# Patient Record
Sex: FEMALE | Race: WHITE | NOT HISPANIC OR LATINO | URBAN - METROPOLITAN AREA
[De-identification: names, ages, dates, MRNs, and addresses within clinical notes are randomized per-mention and may not be internally consistent; named-entity substitution may affect disease eponyms.]

---

## 2017-01-23 ENCOUNTER — HOSPITAL ENCOUNTER (OUTPATIENT)
Dept: PHYSICAL MEDICINE AND REHAB | Facility: CLINIC | Age: 82
Discharge: HOME | End: 2017-01-23
Attending: SOCIAL WORKER

## 2017-01-23 DIAGNOSIS — F41.1 GENERALIZED ANXIETY DISORDER: ICD-10-CM

## 2017-01-23 DIAGNOSIS — F33.1 MAJOR DEPRESSIVE DISORDER, RECURRENT EPISODE, MODERATE DEGREE (H): ICD-10-CM

## 2017-02-06 ENCOUNTER — AMBULATORY - HEALTHEAST (OUTPATIENT)
Dept: PHYSICAL MEDICINE AND REHAB | Facility: CLINIC | Age: 82
End: 2017-02-06

## 2017-02-06 ENCOUNTER — HOSPITAL ENCOUNTER (OUTPATIENT)
Dept: PHYSICAL MEDICINE AND REHAB | Facility: CLINIC | Age: 82
Discharge: HOME | End: 2017-02-06
Attending: SOCIAL WORKER

## 2017-02-06 DIAGNOSIS — F33.1 MAJOR DEPRESSIVE DISORDER, RECURRENT EPISODE, MODERATE DEGREE (H): ICD-10-CM

## 2017-02-06 DIAGNOSIS — F41.1 GENERALIZED ANXIETY DISORDER: ICD-10-CM

## 2017-02-21 ENCOUNTER — HOSPITAL ENCOUNTER (OUTPATIENT)
Dept: PHYSICAL MEDICINE AND REHAB | Facility: CLINIC | Age: 82
Discharge: HOME | End: 2017-02-21
Attending: SOCIAL WORKER

## 2017-02-21 DIAGNOSIS — F41.1 GENERALIZED ANXIETY DISORDER: ICD-10-CM

## 2017-02-21 DIAGNOSIS — F33.1 MAJOR DEPRESSIVE DISORDER, RECURRENT EPISODE, MODERATE DEGREE (H): ICD-10-CM

## 2017-03-06 ENCOUNTER — HOSPITAL ENCOUNTER (OUTPATIENT)
Dept: PHYSICAL MEDICINE AND REHAB | Facility: CLINIC | Age: 82
Discharge: HOME | End: 2017-03-06
Attending: SOCIAL WORKER

## 2017-03-06 DIAGNOSIS — F41.1 GENERALIZED ANXIETY DISORDER: ICD-10-CM

## 2017-03-06 DIAGNOSIS — F33.1 MAJOR DEPRESSIVE DISORDER, RECURRENT EPISODE, MODERATE DEGREE (H): ICD-10-CM

## 2017-03-30 ENCOUNTER — HOSPITAL ENCOUNTER (OUTPATIENT)
Dept: PHYSICAL MEDICINE AND REHAB | Facility: CLINIC | Age: 82
Discharge: HOME | End: 2017-03-30
Attending: SOCIAL WORKER

## 2017-03-30 DIAGNOSIS — F33.1 MAJOR DEPRESSIVE DISORDER, RECURRENT EPISODE, MODERATE DEGREE (H): ICD-10-CM

## 2017-03-30 DIAGNOSIS — F41.1 GENERALIZED ANXIETY DISORDER: ICD-10-CM

## 2017-05-09 ENCOUNTER — HOSPITAL ENCOUNTER (OUTPATIENT)
Dept: PHYSICAL MEDICINE AND REHAB | Facility: CLINIC | Age: 82
Discharge: HOME | End: 2017-05-09
Attending: SOCIAL WORKER

## 2017-05-09 DIAGNOSIS — F33.1 MAJOR DEPRESSIVE DISORDER, RECURRENT EPISODE, MODERATE DEGREE (H): ICD-10-CM

## 2017-05-09 DIAGNOSIS — F41.1 GENERALIZED ANXIETY DISORDER: ICD-10-CM

## 2017-05-11 ENCOUNTER — COMMUNICATION - HEALTHEAST (OUTPATIENT)
Dept: PHYSICAL MEDICINE AND REHAB | Facility: CLINIC | Age: 82
End: 2017-05-11

## 2017-05-22 ENCOUNTER — HOSPITAL ENCOUNTER (OUTPATIENT)
Dept: PHYSICAL MEDICINE AND REHAB | Facility: CLINIC | Age: 82
Discharge: HOME | End: 2017-05-22
Attending: SOCIAL WORKER

## 2017-05-22 DIAGNOSIS — F41.1 GENERALIZED ANXIETY DISORDER: ICD-10-CM

## 2017-05-22 DIAGNOSIS — F33.1 MAJOR DEPRESSIVE DISORDER, RECURRENT EPISODE, MODERATE DEGREE (H): ICD-10-CM

## 2017-06-06 ENCOUNTER — HOSPITAL ENCOUNTER (OUTPATIENT)
Dept: PHYSICAL MEDICINE AND REHAB | Facility: CLINIC | Age: 82
Discharge: HOME | End: 2017-06-06
Attending: SOCIAL WORKER

## 2017-06-06 DIAGNOSIS — F33.1 MAJOR DEPRESSIVE DISORDER, RECURRENT EPISODE, MODERATE DEGREE (H): ICD-10-CM

## 2017-06-06 DIAGNOSIS — F41.1 GENERALIZED ANXIETY DISORDER: ICD-10-CM

## 2017-06-27 ENCOUNTER — HOSPITAL ENCOUNTER (OUTPATIENT)
Dept: PHYSICAL MEDICINE AND REHAB | Facility: CLINIC | Age: 82
Discharge: HOME | End: 2017-06-27
Attending: SOCIAL WORKER

## 2017-06-27 DIAGNOSIS — F33.1 MAJOR DEPRESSIVE DISORDER, RECURRENT EPISODE, MODERATE DEGREE (H): ICD-10-CM

## 2017-06-27 DIAGNOSIS — F41.1 GENERALIZED ANXIETY DISORDER: ICD-10-CM

## 2017-07-10 ENCOUNTER — HOSPITAL ENCOUNTER (OUTPATIENT)
Dept: PHYSICAL MEDICINE AND REHAB | Facility: CLINIC | Age: 82
Discharge: HOME | End: 2017-07-10
Attending: SOCIAL WORKER

## 2017-07-10 DIAGNOSIS — F41.1 GENERALIZED ANXIETY DISORDER: ICD-10-CM

## 2017-07-10 DIAGNOSIS — F33.1 MAJOR DEPRESSIVE DISORDER, RECURRENT EPISODE, MODERATE DEGREE (H): ICD-10-CM

## 2017-07-24 ENCOUNTER — HOSPITAL ENCOUNTER (OUTPATIENT)
Dept: PHYSICAL MEDICINE AND REHAB | Facility: CLINIC | Age: 82
Discharge: HOME | End: 2017-07-24
Attending: SOCIAL WORKER

## 2017-07-24 DIAGNOSIS — F33.1 MAJOR DEPRESSIVE DISORDER, RECURRENT EPISODE, MODERATE DEGREE (H): ICD-10-CM

## 2017-07-24 DIAGNOSIS — F41.1 GENERALIZED ANXIETY DISORDER: ICD-10-CM

## 2017-08-07 ENCOUNTER — HOSPITAL ENCOUNTER (OUTPATIENT)
Dept: PHYSICAL MEDICINE AND REHAB | Facility: CLINIC | Age: 82
Discharge: HOME | End: 2017-08-07
Attending: SOCIAL WORKER

## 2017-08-07 ENCOUNTER — AMBULATORY - HEALTHEAST (OUTPATIENT)
Dept: PHYSICAL MEDICINE AND REHAB | Facility: CLINIC | Age: 82
End: 2017-08-07

## 2017-08-07 DIAGNOSIS — F33.1 MAJOR DEPRESSIVE DISORDER, RECURRENT EPISODE, MODERATE DEGREE (H): ICD-10-CM

## 2017-08-07 DIAGNOSIS — F41.1 GENERALIZED ANXIETY DISORDER: ICD-10-CM

## 2017-09-05 ENCOUNTER — HOSPITAL ENCOUNTER (OUTPATIENT)
Dept: PHYSICAL MEDICINE AND REHAB | Facility: CLINIC | Age: 82
Discharge: HOME | End: 2017-09-05
Attending: SOCIAL WORKER

## 2017-09-05 DIAGNOSIS — F33.1 MAJOR DEPRESSIVE DISORDER, RECURRENT EPISODE, MODERATE DEGREE (H): ICD-10-CM

## 2017-09-05 DIAGNOSIS — F41.1 GENERALIZED ANXIETY DISORDER: ICD-10-CM

## 2017-09-19 ENCOUNTER — HOSPITAL ENCOUNTER (OUTPATIENT)
Dept: PHYSICAL MEDICINE AND REHAB | Facility: CLINIC | Age: 82
Discharge: HOME | End: 2017-09-19
Attending: SOCIAL WORKER

## 2017-09-19 DIAGNOSIS — F41.1 GENERALIZED ANXIETY DISORDER: ICD-10-CM

## 2017-09-19 DIAGNOSIS — F33.1 MAJOR DEPRESSIVE DISORDER, RECURRENT EPISODE, MODERATE DEGREE (H): ICD-10-CM

## 2017-10-02 ENCOUNTER — HOSPITAL ENCOUNTER (OUTPATIENT)
Dept: PHYSICAL MEDICINE AND REHAB | Facility: CLINIC | Age: 82
Discharge: HOME | End: 2017-10-02
Attending: SOCIAL WORKER

## 2017-10-02 DIAGNOSIS — F41.1 GENERALIZED ANXIETY DISORDER: ICD-10-CM

## 2017-10-02 DIAGNOSIS — F33.1 MAJOR DEPRESSIVE DISORDER, RECURRENT EPISODE, MODERATE DEGREE (H): ICD-10-CM

## 2017-10-16 ENCOUNTER — HOSPITAL ENCOUNTER (OUTPATIENT)
Dept: PHYSICAL MEDICINE AND REHAB | Facility: CLINIC | Age: 82
Discharge: HOME | End: 2017-10-16
Attending: SOCIAL WORKER

## 2017-10-16 DIAGNOSIS — F33.1 MAJOR DEPRESSIVE DISORDER, RECURRENT EPISODE, MODERATE DEGREE (H): ICD-10-CM

## 2017-10-16 DIAGNOSIS — F41.1 GENERALIZED ANXIETY DISORDER: ICD-10-CM

## 2017-10-31 ENCOUNTER — HOSPITAL ENCOUNTER (OUTPATIENT)
Dept: PHYSICAL MEDICINE AND REHAB | Facility: CLINIC | Age: 82
Discharge: HOME | End: 2017-10-31
Attending: SOCIAL WORKER

## 2017-10-31 DIAGNOSIS — F33.1 MAJOR DEPRESSIVE DISORDER, RECURRENT EPISODE, MODERATE DEGREE (H): ICD-10-CM

## 2017-10-31 DIAGNOSIS — F41.1 GENERALIZED ANXIETY DISORDER: ICD-10-CM

## 2017-11-10 ENCOUNTER — HOSPITAL ENCOUNTER (OUTPATIENT)
Dept: PHYSICAL MEDICINE AND REHAB | Facility: CLINIC | Age: 82
Discharge: HOME | End: 2017-11-10
Attending: SOCIAL WORKER

## 2017-11-10 DIAGNOSIS — F33.1 MAJOR DEPRESSIVE DISORDER, RECURRENT EPISODE, MODERATE DEGREE (H): ICD-10-CM

## 2017-11-10 DIAGNOSIS — F41.1 GENERALIZED ANXIETY DISORDER: ICD-10-CM

## 2017-11-27 ENCOUNTER — HOSPITAL ENCOUNTER (OUTPATIENT)
Dept: PHYSICAL MEDICINE AND REHAB | Facility: CLINIC | Age: 82
Discharge: HOME | End: 2017-11-27
Attending: SOCIAL WORKER

## 2017-11-27 DIAGNOSIS — F33.1 MAJOR DEPRESSIVE DISORDER, RECURRENT EPISODE, MODERATE DEGREE (H): ICD-10-CM

## 2017-11-27 DIAGNOSIS — F41.1 GENERALIZED ANXIETY DISORDER: ICD-10-CM

## 2018-01-03 ENCOUNTER — HOSPITAL ENCOUNTER (OUTPATIENT)
Dept: PHYSICAL MEDICINE AND REHAB | Facility: CLINIC | Age: 83
Discharge: HOME | End: 2018-01-03
Attending: SOCIAL WORKER

## 2018-01-03 DIAGNOSIS — F33.1 MAJOR DEPRESSIVE DISORDER, RECURRENT EPISODE, MODERATE DEGREE (H): ICD-10-CM

## 2018-01-03 DIAGNOSIS — F41.1 GENERALIZED ANXIETY DISORDER: ICD-10-CM

## 2018-01-31 ENCOUNTER — HOSPITAL ENCOUNTER (OUTPATIENT)
Dept: PHYSICAL MEDICINE AND REHAB | Facility: CLINIC | Age: 83
Discharge: HOME | End: 2018-01-31
Attending: SOCIAL WORKER

## 2018-01-31 DIAGNOSIS — F33.1 MAJOR DEPRESSIVE DISORDER, RECURRENT EPISODE, MODERATE DEGREE (H): ICD-10-CM

## 2018-01-31 DIAGNOSIS — F41.1 GENERALIZED ANXIETY DISORDER: ICD-10-CM

## 2018-03-12 ENCOUNTER — HOSPITAL ENCOUNTER (OUTPATIENT)
Dept: PHYSICAL MEDICINE AND REHAB | Facility: CLINIC | Age: 83
Discharge: HOME | End: 2018-03-12
Attending: SOCIAL WORKER

## 2018-03-12 DIAGNOSIS — F41.1 GENERALIZED ANXIETY DISORDER: ICD-10-CM

## 2018-03-12 DIAGNOSIS — F33.1 MAJOR DEPRESSIVE DISORDER, RECURRENT EPISODE, MODERATE DEGREE (H): ICD-10-CM

## 2018-04-17 ENCOUNTER — AMBULATORY - HEALTHEAST (OUTPATIENT)
Dept: PHYSICAL MEDICINE AND REHAB | Facility: CLINIC | Age: 83
End: 2018-04-17

## 2018-04-17 ENCOUNTER — HOSPITAL ENCOUNTER (OUTPATIENT)
Dept: PHYSICAL MEDICINE AND REHAB | Facility: CLINIC | Age: 83
Discharge: HOME | End: 2018-04-17
Attending: SOCIAL WORKER

## 2018-04-17 DIAGNOSIS — F33.1 MAJOR DEPRESSIVE DISORDER, RECURRENT EPISODE, MODERATE DEGREE (H): ICD-10-CM

## 2018-04-17 DIAGNOSIS — F41.1 GENERALIZED ANXIETY DISORDER: ICD-10-CM

## 2018-05-04 ENCOUNTER — AMBULATORY - HEALTHEAST (OUTPATIENT)
Dept: VASCULAR SURGERY | Facility: CLINIC | Age: 83
End: 2018-05-04

## 2018-05-04 DIAGNOSIS — S55.101A: ICD-10-CM

## 2018-05-04 DIAGNOSIS — M25.539 WRIST PAIN: ICD-10-CM

## 2018-05-04 DIAGNOSIS — M79.601 ARM PAIN, ANTERIOR, RIGHT: ICD-10-CM

## 2018-05-07 ENCOUNTER — RECORDS - HEALTHEAST (OUTPATIENT)
Dept: VASCULAR ULTRASOUND | Facility: CLINIC | Age: 83
End: 2018-05-07

## 2018-05-07 ENCOUNTER — COMMUNICATION - HEALTHEAST (OUTPATIENT)
Dept: VASCULAR SURGERY | Facility: CLINIC | Age: 83
End: 2018-05-07

## 2018-05-07 ENCOUNTER — OFFICE VISIT - HEALTHEAST (OUTPATIENT)
Dept: VASCULAR SURGERY | Facility: CLINIC | Age: 83
End: 2018-05-07

## 2018-05-07 DIAGNOSIS — M48.02 SPINAL STENOSIS OF CERVICAL REGION: ICD-10-CM

## 2018-05-07 DIAGNOSIS — S55.101A: ICD-10-CM

## 2018-05-07 DIAGNOSIS — M79.601 PAIN IN RIGHT ARM: ICD-10-CM

## 2018-05-07 DIAGNOSIS — M25.539 PAIN IN UNSPECIFIED WRIST: ICD-10-CM

## 2018-05-16 ENCOUNTER — AMBULATORY - HEALTHEAST (OUTPATIENT)
Dept: LAB | Facility: HOSPITAL | Age: 83
End: 2018-05-16

## 2018-05-16 ENCOUNTER — HOSPITAL ENCOUNTER (OUTPATIENT)
Dept: PHYSICAL MEDICINE AND REHAB | Facility: CLINIC | Age: 83
Discharge: HOME | End: 2018-05-16
Attending: SURGERY

## 2018-05-16 DIAGNOSIS — B27.90 EPSTEIN BARR VIRUS INFECTION: ICD-10-CM

## 2018-05-16 DIAGNOSIS — M79.18 MYOFASCIAL PAIN: ICD-10-CM

## 2018-05-16 DIAGNOSIS — M54.2 NECK PAIN: ICD-10-CM

## 2018-05-16 DIAGNOSIS — G93.32 CHRONIC FATIGUE SYNDROME: ICD-10-CM

## 2018-05-16 DIAGNOSIS — M54.12 CERVICAL RADICULITIS: ICD-10-CM

## 2018-05-16 LAB
ALT SERPL W P-5'-P-CCNC: 12 U/L (ref 0–45)
AST SERPL W P-5'-P-CCNC: 19 U/L (ref 0–40)

## 2018-05-23 ENCOUNTER — HOSPITAL ENCOUNTER (OUTPATIENT)
Dept: PHYSICAL MEDICINE AND REHAB | Facility: CLINIC | Age: 83
Discharge: HOME | End: 2018-05-23
Attending: SOCIAL WORKER

## 2018-05-23 DIAGNOSIS — F41.1 GENERALIZED ANXIETY DISORDER: ICD-10-CM

## 2018-05-23 DIAGNOSIS — F33.1 MAJOR DEPRESSIVE DISORDER, RECURRENT EPISODE, MODERATE DEGREE (H): ICD-10-CM

## 2018-06-04 ENCOUNTER — COMMUNICATION - HEALTHEAST (OUTPATIENT)
Dept: PHYSICAL MEDICINE AND REHAB | Facility: CLINIC | Age: 83
End: 2018-06-04

## 2018-06-06 ENCOUNTER — COMMUNICATION - HEALTHEAST (OUTPATIENT)
Dept: PHYSICAL MEDICINE AND REHAB | Facility: CLINIC | Age: 83
End: 2018-06-06

## 2018-06-18 ENCOUNTER — HOSPITAL ENCOUNTER (OUTPATIENT)
Dept: PHYSICAL MEDICINE AND REHAB | Facility: CLINIC | Age: 83
Discharge: HOME | End: 2018-06-18
Attending: SOCIAL WORKER

## 2018-06-18 DIAGNOSIS — F33.1 MAJOR DEPRESSIVE DISORDER, RECURRENT EPISODE, MODERATE DEGREE (H): ICD-10-CM

## 2018-06-18 DIAGNOSIS — F41.1 GENERALIZED ANXIETY DISORDER: ICD-10-CM

## 2018-07-09 ENCOUNTER — COMMUNICATION - HEALTHEAST (OUTPATIENT)
Dept: PHYSICAL MEDICINE AND REHAB | Facility: CLINIC | Age: 83
End: 2018-07-09

## 2018-07-12 ENCOUNTER — HOSPITAL ENCOUNTER (OUTPATIENT)
Dept: PHYSICAL MEDICINE AND REHAB | Facility: CLINIC | Age: 83
Discharge: HOME | End: 2018-07-12
Attending: PHYSICIAN ASSISTANT

## 2018-07-12 DIAGNOSIS — M54.2 CERVICAL SPINE PAIN: ICD-10-CM

## 2018-07-12 DIAGNOSIS — M25.511 RIGHT SHOULDER PAIN: ICD-10-CM

## 2018-07-12 DIAGNOSIS — M54.12 CERVICAL RADICULITIS: ICD-10-CM

## 2018-07-12 RX ORDER — AMITRIPTYLINE HYDROCHLORIDE 10 MG/1
20 TABLET ORAL AT BEDTIME
Status: SHIPPED | COMMUNITY
Start: 2018-05-21

## 2018-07-18 ENCOUNTER — HOSPITAL ENCOUNTER (OUTPATIENT)
Dept: PHYSICAL MEDICINE AND REHAB | Facility: CLINIC | Age: 83
Discharge: HOME | End: 2018-07-18
Attending: SOCIAL WORKER

## 2018-07-18 DIAGNOSIS — F33.1 MAJOR DEPRESSIVE DISORDER, RECURRENT EPISODE, MODERATE DEGREE (H): ICD-10-CM

## 2018-07-18 DIAGNOSIS — F41.1 GENERALIZED ANXIETY DISORDER: ICD-10-CM

## 2018-07-26 ENCOUNTER — COMMUNICATION - HEALTHEAST (OUTPATIENT)
Dept: PHYSICAL MEDICINE AND REHAB | Facility: CLINIC | Age: 83
End: 2018-07-26

## 2018-07-26 ENCOUNTER — RECORDS - HEALTHEAST (OUTPATIENT)
Dept: ADMINISTRATIVE | Facility: OTHER | Age: 83
End: 2018-07-26

## 2018-07-26 ENCOUNTER — RECORDS - HEALTHEAST (OUTPATIENT)
Dept: LAB | Facility: CLINIC | Age: 83
End: 2018-07-26

## 2018-07-26 DIAGNOSIS — M75.100 ROTATOR CUFF TEAR: ICD-10-CM

## 2018-07-26 LAB — VIT B12 SERPL-MCNC: 401 PG/ML (ref 213–816)

## 2018-07-27 LAB — 25(OH)D3 SERPL-MCNC: 51.9 NG/ML (ref 30–80)

## 2018-08-01 ENCOUNTER — COMMUNICATION - HEALTHEAST (OUTPATIENT)
Dept: PHYSICAL MEDICINE AND REHAB | Facility: CLINIC | Age: 83
End: 2018-08-01

## 2018-08-01 DIAGNOSIS — M75.100 SUPRASPINATUS TENDON TEAR: ICD-10-CM

## 2018-08-01 DIAGNOSIS — M25.519 SHOULDER PAIN: ICD-10-CM

## 2018-08-09 ENCOUNTER — OFFICE VISIT - HEALTHEAST (OUTPATIENT)
Dept: FAMILY MEDICINE | Facility: CLINIC | Age: 83
End: 2018-08-09

## 2018-08-09 DIAGNOSIS — R10.11 RUQ ABDOMINAL PAIN: ICD-10-CM

## 2018-08-09 DIAGNOSIS — M54.12 CERVICAL RADICULOPATHY: ICD-10-CM

## 2018-08-09 DIAGNOSIS — M75.100 ROTATOR CUFF TEAR: ICD-10-CM

## 2018-08-09 ASSESSMENT — MIFFLIN-ST. JEOR: SCORE: 1096.33

## 2018-08-23 ENCOUNTER — RECORDS - HEALTHEAST (OUTPATIENT)
Dept: ADMINISTRATIVE | Facility: OTHER | Age: 83
End: 2018-08-23

## 2018-08-27 ENCOUNTER — RECORDS - HEALTHEAST (OUTPATIENT)
Dept: ADMINISTRATIVE | Facility: OTHER | Age: 83
End: 2018-08-27

## 2018-08-29 ENCOUNTER — HOSPITAL ENCOUNTER (OUTPATIENT)
Dept: PHYSICAL MEDICINE AND REHAB | Facility: CLINIC | Age: 83
Discharge: HOME | End: 2018-08-29
Attending: SOCIAL WORKER

## 2018-08-29 DIAGNOSIS — F33.1 MAJOR DEPRESSIVE DISORDER, RECURRENT EPISODE, MODERATE DEGREE (H): ICD-10-CM

## 2018-08-29 DIAGNOSIS — F41.1 GENERALIZED ANXIETY DISORDER: ICD-10-CM

## 2018-09-26 ENCOUNTER — HOSPITAL ENCOUNTER (OUTPATIENT)
Dept: PHYSICAL MEDICINE AND REHAB | Facility: CLINIC | Age: 83
Discharge: HOME | End: 2018-09-26
Attending: SOCIAL WORKER

## 2018-09-26 DIAGNOSIS — F41.1 GENERALIZED ANXIETY DISORDER: ICD-10-CM

## 2018-09-26 DIAGNOSIS — F33.1 MAJOR DEPRESSIVE DISORDER, RECURRENT EPISODE, MODERATE DEGREE (H): ICD-10-CM

## 2018-11-07 ENCOUNTER — HOSPITAL ENCOUNTER (OUTPATIENT)
Dept: PHYSICAL MEDICINE AND REHAB | Facility: CLINIC | Age: 83
Discharge: HOME | End: 2018-11-07
Attending: SOCIAL WORKER

## 2018-11-07 ENCOUNTER — AMBULATORY - HEALTHEAST (OUTPATIENT)
Dept: PHYSICAL MEDICINE AND REHAB | Facility: CLINIC | Age: 83
End: 2018-11-07

## 2018-11-07 DIAGNOSIS — F33.1 MAJOR DEPRESSIVE DISORDER, RECURRENT EPISODE, MODERATE DEGREE (H): ICD-10-CM

## 2018-11-07 DIAGNOSIS — F41.1 GENERALIZED ANXIETY DISORDER: ICD-10-CM

## 2018-12-12 ENCOUNTER — HOSPITAL ENCOUNTER (OUTPATIENT)
Dept: PHYSICAL MEDICINE AND REHAB | Facility: CLINIC | Age: 83
Discharge: HOME | End: 2018-12-12
Attending: SOCIAL WORKER

## 2018-12-12 DIAGNOSIS — F41.1 GENERALIZED ANXIETY DISORDER: ICD-10-CM

## 2018-12-12 DIAGNOSIS — F33.1 MAJOR DEPRESSIVE DISORDER, RECURRENT EPISODE, MODERATE DEGREE (H): ICD-10-CM

## 2019-01-03 ENCOUNTER — RECORDS - HEALTHEAST (OUTPATIENT)
Dept: ADMINISTRATIVE | Facility: OTHER | Age: 84
End: 2019-01-03

## 2019-01-09 ENCOUNTER — HOSPITAL ENCOUNTER (OUTPATIENT)
Dept: PHYSICAL MEDICINE AND REHAB | Facility: CLINIC | Age: 84
Discharge: HOME | End: 2019-01-09
Attending: SOCIAL WORKER

## 2019-01-09 DIAGNOSIS — F41.1 GENERALIZED ANXIETY DISORDER: ICD-10-CM

## 2019-01-09 DIAGNOSIS — F33.1 MAJOR DEPRESSIVE DISORDER, RECURRENT EPISODE, MODERATE DEGREE (H): ICD-10-CM

## 2019-02-22 ENCOUNTER — HOSPITAL ENCOUNTER (OUTPATIENT)
Dept: PHYSICAL MEDICINE AND REHAB | Facility: CLINIC | Age: 84
Discharge: HOME | End: 2019-02-22
Attending: SOCIAL WORKER

## 2019-02-22 DIAGNOSIS — F41.1 GENERALIZED ANXIETY DISORDER: ICD-10-CM

## 2019-02-22 DIAGNOSIS — F33.1 MAJOR DEPRESSIVE DISORDER, RECURRENT EPISODE, MODERATE DEGREE (H): ICD-10-CM

## 2019-03-13 ENCOUNTER — HOSPITAL ENCOUNTER (OUTPATIENT)
Dept: MAMMOGRAPHY | Facility: CLINIC | Age: 84
Discharge: HOME | End: 2019-03-13
Attending: FAMILY MEDICINE

## 2019-03-13 DIAGNOSIS — Z12.31 VISIT FOR SCREENING MAMMOGRAM: ICD-10-CM

## 2019-03-19 ENCOUNTER — HOSPITAL ENCOUNTER (OUTPATIENT)
Dept: PHYSICAL MEDICINE AND REHAB | Facility: CLINIC | Age: 84
Discharge: HOME | End: 2019-03-19
Attending: SOCIAL WORKER

## 2019-03-19 DIAGNOSIS — F33.1 MAJOR DEPRESSIVE DISORDER, RECURRENT EPISODE, MODERATE DEGREE (H): ICD-10-CM

## 2019-03-19 DIAGNOSIS — F41.1 GENERALIZED ANXIETY DISORDER: ICD-10-CM

## 2019-04-16 ENCOUNTER — HOSPITAL ENCOUNTER (OUTPATIENT)
Dept: PHYSICAL MEDICINE AND REHAB | Facility: CLINIC | Age: 84
Discharge: HOME | End: 2019-04-16
Attending: SOCIAL WORKER

## 2019-04-16 DIAGNOSIS — F41.1 GENERALIZED ANXIETY DISORDER: ICD-10-CM

## 2019-04-16 DIAGNOSIS — F33.1 MAJOR DEPRESSIVE DISORDER, RECURRENT EPISODE, MODERATE DEGREE (H): ICD-10-CM

## 2019-05-15 ENCOUNTER — HOSPITAL ENCOUNTER (OUTPATIENT)
Dept: PHYSICAL MEDICINE AND REHAB | Facility: CLINIC | Age: 84
Discharge: HOME | End: 2019-05-15
Attending: SOCIAL WORKER

## 2019-05-15 ENCOUNTER — RECORDS - HEALTHEAST (OUTPATIENT)
Dept: ADMINISTRATIVE | Facility: OTHER | Age: 84
End: 2019-05-15

## 2019-05-15 DIAGNOSIS — F33.1 MAJOR DEPRESSIVE DISORDER, RECURRENT EPISODE, MODERATE DEGREE (H): ICD-10-CM

## 2019-05-15 DIAGNOSIS — F41.1 GENERALIZED ANXIETY DISORDER: ICD-10-CM

## 2019-05-22 ENCOUNTER — HOSPITAL ENCOUNTER (OUTPATIENT)
Dept: CT IMAGING | Facility: HOSPITAL | Age: 84
Discharge: HOME | End: 2019-05-22
Attending: COLON & RECTAL SURGERY

## 2019-05-22 DIAGNOSIS — R10.31 RLQ ABDOMINAL PAIN: ICD-10-CM

## 2019-05-22 LAB
CREAT BLD-MCNC: 1.1 MG/DL (ref 0.6–1.1)
GFR SERPL CREATININE-BSD FRML MDRD: 47 ML/MIN/1.73M2

## 2019-07-31 ENCOUNTER — AMBULATORY - HEALTHEAST (OUTPATIENT)
Dept: PHYSICAL MEDICINE AND REHAB | Facility: CLINIC | Age: 84
End: 2019-07-31

## 2019-08-08 ENCOUNTER — RECORDS - HEALTHEAST (OUTPATIENT)
Dept: ADMINISTRATIVE | Facility: OTHER | Age: 84
End: 2019-08-08

## 2019-08-08 ENCOUNTER — RECORDS - HEALTHEAST (OUTPATIENT)
Dept: LAB | Facility: CLINIC | Age: 84
End: 2019-08-08

## 2019-08-08 LAB
HBA1C MFR BLD: 5.3 % (ref 4.2–6.1)
RHEUMATOID FACT SERPL-ACNC: 46.6 IU/ML (ref 0–30)
URATE SERPL-MCNC: 4.1 MG/DL (ref 2–7.5)

## 2019-08-09 ENCOUNTER — RECORDS - HEALTHEAST (OUTPATIENT)
Dept: LAB | Facility: CLINIC | Age: 84
End: 2019-08-09

## 2019-08-09 LAB — 25(OH)D3 SERPL-MCNC: 41.5 NG/ML (ref 30–80)

## 2019-08-13 ENCOUNTER — RECORDS - HEALTHEAST (OUTPATIENT)
Dept: ADMINISTRATIVE | Facility: OTHER | Age: 84
End: 2019-08-13

## 2019-08-14 ENCOUNTER — COMMUNICATION - HEALTHEAST (OUTPATIENT)
Dept: ADMINISTRATIVE | Facility: CLINIC | Age: 84
End: 2019-08-14

## 2019-08-15 ENCOUNTER — OFFICE VISIT - HEALTHEAST (OUTPATIENT)
Dept: RHEUMATOLOGY | Facility: CLINIC | Age: 84
End: 2019-08-15

## 2019-08-15 DIAGNOSIS — M15.0 PRIMARY GENERALIZED (OSTEO)ARTHRITIS: ICD-10-CM

## 2019-08-15 DIAGNOSIS — M05.9 SEROPOSITIVE RHEUMATOID ARTHRITIS (H): ICD-10-CM

## 2019-08-15 DIAGNOSIS — M15.0 PRIMARY OSTEOARTHRITIS INVOLVING MULTIPLE JOINTS: ICD-10-CM

## 2019-08-15 DIAGNOSIS — M25.50 POLYARTHRALGIA: ICD-10-CM

## 2019-08-15 LAB
ALBUMIN SERPL-MCNC: 3.6 G/DL (ref 3.5–5)
ALT SERPL W P-5'-P-CCNC: 16 U/L (ref 0–45)
BASOPHILS # BLD AUTO: 0.1 THOU/UL (ref 0–0.2)
BASOPHILS NFR BLD AUTO: 1 % (ref 0–2)
C REACTIVE PROTEIN LHE: 1.4 MG/DL (ref 0–0.8)
CCP AB SER IA-ACNC: 2.2 U/ML
CREAT SERPL-MCNC: 0.83 MG/DL (ref 0.6–1.1)
EOSINOPHIL # BLD AUTO: 0.3 THOU/UL (ref 0–0.4)
EOSINOPHIL NFR BLD AUTO: 4 % (ref 0–6)
ERYTHROCYTE [DISTWIDTH] IN BLOOD BY AUTOMATED COUNT: 11.4 % (ref 11–14.5)
ERYTHROCYTE [SEDIMENTATION RATE] IN BLOOD BY WESTERGREN METHOD: 13 MM/HR (ref 0–20)
GFR SERPL CREATININE-BSD FRML MDRD: >60 ML/MIN/1.73M2
HCT VFR BLD AUTO: 38.3 % (ref 35–47)
HGB BLD-MCNC: 12.9 G/DL (ref 12–16)
LYMPHOCYTES # BLD AUTO: 2.3 THOU/UL (ref 0.8–4.4)
LYMPHOCYTES NFR BLD AUTO: 27 % (ref 20–40)
MCH RBC QN AUTO: 32.1 PG (ref 27–34)
MCHC RBC AUTO-ENTMCNC: 33.7 G/DL (ref 32–36)
MCV RBC AUTO: 95 FL (ref 80–100)
MONOCYTES # BLD AUTO: 0.7 THOU/UL (ref 0–0.9)
MONOCYTES NFR BLD AUTO: 8 % (ref 2–10)
NEUTROPHILS # BLD AUTO: 5.1 THOU/UL (ref 2–7.7)
NEUTROPHILS NFR BLD AUTO: 60 % (ref 50–70)
PLATELET # BLD AUTO: 298 THOU/UL (ref 140–440)
PMV BLD AUTO: 7 FL (ref 7–10)
RBC # BLD AUTO: 4.02 MILL/UL (ref 3.8–5.4)
WBC: 8.6 THOU/UL (ref 4–11)

## 2019-08-15 ASSESSMENT — MIFFLIN-ST. JEOR: SCORE: 1110.4

## 2019-08-16 LAB
HBV SURFACE AG SERPL QL IA: NEGATIVE
HCV AB SERPL QL IA: NEGATIVE

## 2019-08-19 LAB
ANA SER QL: 4.6 U
ANCA IGG TITR SER IF: NORMAL {TITER}

## 2019-08-20 LAB
DNA (DS) ANTIBODY - HISTORICAL: 7 IU
JO-1 AUTOANTIBODIES - HISTORICAL: 0 EU
SCL-70 AUTOANTIBODIES - HISTORICAL: 0 EU
SM (SMITH AUTOANTIBODIES - HISTORICAL: 3 EU
SM/RNP AUTOANTIBODIES - HISTORICAL: 1 EU
SS-A/RO AUTOANTIBODIES - HISTORICAL: 77 EU
SS-B/LA AUTOANTIBODIES - HISTORICAL: 2 EU

## 2019-08-23 ENCOUNTER — RECORDS - HEALTHEAST (OUTPATIENT)
Dept: HEALTH INFORMATION MANAGEMENT | Facility: CLINIC | Age: 84
End: 2019-08-23

## 2019-09-03 ENCOUNTER — ANESTHESIA - HEALTHEAST (OUTPATIENT)
Dept: SURGERY | Facility: HOSPITAL | Age: 84
End: 2019-09-03

## 2019-09-03 ENCOUNTER — SURGERY - HEALTHEAST (OUTPATIENT)
Dept: SURGERY | Facility: HOSPITAL | Age: 84
End: 2019-09-03

## 2019-09-05 ASSESSMENT — MIFFLIN-ST. JEOR: SCORE: 1098.6

## 2019-09-10 ENCOUNTER — SURGERY - HEALTHEAST (OUTPATIENT)
Dept: SURGERY | Facility: HOSPITAL | Age: 84
End: 2019-09-10

## 2019-09-10 ENCOUNTER — ANESTHESIA - HEALTHEAST (OUTPATIENT)
Dept: SURGERY | Facility: HOSPITAL | Age: 84
End: 2019-09-10

## 2019-10-17 ENCOUNTER — OFFICE VISIT - HEALTHEAST (OUTPATIENT)
Dept: FAMILY MEDICINE | Facility: CLINIC | Age: 84
End: 2019-10-17

## 2019-10-17 DIAGNOSIS — R60.0 LOWER LEG EDEMA: ICD-10-CM

## 2019-10-17 DIAGNOSIS — B02.9 HERPES ZOSTER WITHOUT COMPLICATION: ICD-10-CM

## 2019-10-17 RX ORDER — OXYCODONE HCL 10 MG/1
10 TABLET, FILM COATED, EXTENDED RELEASE ORAL
Status: SHIPPED | COMMUNITY
Start: 2019-10-17

## 2021-05-25 ENCOUNTER — RECORDS - HEALTHEAST (OUTPATIENT)
Dept: ADMINISTRATIVE | Facility: CLINIC | Age: 86
End: 2021-05-25

## 2021-05-27 ENCOUNTER — RECORDS - HEALTHEAST (OUTPATIENT)
Dept: ADMINISTRATIVE | Facility: CLINIC | Age: 86
End: 2021-05-27

## 2021-05-27 NOTE — PROGRESS NOTES
MENTAL HEALTH VISIT NOTE    Date of Service: 4/16/2019    Start time:2:00 pm  Stop time:2:49 pm  This is session number 4 this calendar year.  The patient was seen for individual psychotherapy    No  was required because the patient speaks and understands English.  Shyanne Gonzalez is a 83 y.o. female is being seen today for individual psychotherapy to address the following:    Chief Complaint   Patient presents with     Depression     Anxiety   .     NECESSITY: This individual session was necessary for the care of the patient to address difficulties in psychosocial functioning that are affected by and affect the patient's ability to cope with and heal from spinal conditions and are affected by the patient's mental health diagnosis listed in the diagnosis section below.    New symptoms or complaints: None    Functional Impairment (0-4):   Personal: 3  Family: 1  Work: 3  Social:3    Clinical Assessment of Mental Status  Mood: Euthymic    Affect:   Congruent with content of speech    Appearance:  well-groomed, casually dressed, engaged    Speech:  Within normal limits    Orientation:   Oriented to person, time, and place    Memory:  No evidence of impairment.    Concentration:  Within normal limits    Focus:   Within normal limits    Fund of knowledge:  adequate    Suicidal Ideation present:   Absent  Suicidal Risk Assessment:  No specific plan to harm self.  Patient does not endorse any intention to harm self.  Patient is aware of resources available if she experiences suicidal ideation.      Homicidal Risk Assessment:   None reported  No crisis plan required due to absence of risk.    Patient's impression of their current status:  Symptoms are staying about the same.  She notes today is the first good day she has had in a number of weeks.    Therapist impression of patient's current status: Symptoms are about the same.    Type of psychotherapeutic technique provided:  CBT, motivational interviewing,  supportive therapy    Response to psychotherapeutic interventions:  Patient responded to interventions in the following manner: Accepting.    Progress toward short term goals:  Progress as expected: Patient is practicing skills taught in psychotherapy and seeing benefits.    Review of long-term goals:  Not done at today's visit. Tx Plan updated 11/7/18:  1. Increase ability to cope well with my chronic health conditions from  some  of the time to most of the time.  2.              Increase % of time I communicate assertively with others from  40% to 80%.  3.  Improve ability to get good sleep from one night out of the week to four nights per week.    Diagnosis:   1. Major depressive disorder, recurrent episode, moderate degree (H)    2. Generalized anxiety disorder      Plan and Follow-Up:  Patient will return for follow-up psychotherapy session in one month.    Patient will complete the following homework before our next session:  Patient plans to continue working with above-mentioned skills.    Discharge Criteria/ Planning:  Patient will continue with follow-up until therapy can be discontinued without return of symptoms.      Candelaria Barr 4/16/2019

## 2021-05-28 ENCOUNTER — RECORDS - HEALTHEAST (OUTPATIENT)
Dept: ADMINISTRATIVE | Facility: CLINIC | Age: 86
End: 2021-05-28

## 2021-05-28 NOTE — PROGRESS NOTES
MENTAL HEALTH VISIT NOTE    Date of Service: 5/15/2019    Start time:3:00 pm  Stop time:3:50 pm  This is session number 5 this calendar year.  The patient was seen for individual psychotherapy    No  was required because the patient speaks and understands English.  Shyanne Gonzalez is a 83 y.o. female is being seen today for individual psychotherapy to address the following:    Chief Complaint   Patient presents with     Depression     Anxiety   .     NECESSITY: This individual session was necessary for the care of the patient to address difficulties in psychosocial functioning that are affected by and affect the patient's ability to cope with and heal from spinal conditions and are affected by the patient's mental health diagnosis listed in the diagnosis section below.    New symptoms or complaints: None    Functional Impairment (0-4):   Personal: 3  Family: 1  Work: 3  Social:2    Clinical Assessment of Mental Status  Mood: Euthymic    Affect:   Congruent with content of speech    Appearance:  well-groomed, casually dressed, engaged    Speech:  Within normal limits    Orientation:   Oriented to person, time, and place    Memory:  No evidence of impairment.    Concentration:  Within normal limits    Focus:   Within normal limits    Fund of knowledge:  adequate    Suicidal Ideation present:   Absent  Suicidal Risk Assessment:  No specific plan to harm self.  Patient does not endorse any intention to harm self.  Patient is aware of resources available if she experiences suicidal ideation.      Homicidal Risk Assessment:   None reported  No crisis plan required due to absence of risk.    Patient's impression of their current status:  Continuing to improve.  Assertiveness has increased.    Therapist impression of patient's current status: Continuing to improve.    Type of psychotherapeutic technique provided:  CBT, motivational interviewing    Response to psychotherapeutic interventions:  Patient responded  to interventions in the following manner: Enthusiastic.    Progress toward short term goals:  Progress as expected: Patient is practicing skills taught in psychotherapy and seeing benefits.    Review of long-term goals:  Not done at today's visit. Tx Plan updated 11/7/18:  1. Increase ability to cope well with my chronic health conditions from  some  of the time to most of the time.  2.              Increase % of time I communicate assertively with others from  40% to 80%.  3.  Improve ability to get good sleep from one night out of the week to four nights per week.    Diagnosis:   1. Major depressive disorder, recurrent episode, moderate degree (H)    2. Generalized anxiety disorder      Plan and Follow-Up:  Patient will return for follow-up psychotherapy session in one month.    Patient will complete the following homework before our next session:  Patient plans to continue working with above-mentioned skills.    Discharge Criteria/ Planning:  Patient will continue with follow-up until therapy can be discontinued without return of symptoms.      Candelaria Barr 5/15/2019

## 2021-05-29 ENCOUNTER — RECORDS - HEALTHEAST (OUTPATIENT)
Dept: ADMINISTRATIVE | Facility: CLINIC | Age: 86
End: 2021-05-29

## 2021-05-31 NOTE — ANESTHESIA PREPROCEDURE EVALUATION
Anesthesia Evaluation      Patient summary reviewed   No history of anesthetic complications     Airway   Mallampati: II  Neck ROM: full   Pulmonary - normal exam    breath sounds clear to auscultation  (+) sleep apnea,   (-) not a smoker    ROS comment: pulm fibrosis                         Cardiovascular - normal exam  (-) angina  Rhythm: regular  Rate: normal,         Neuro/Psych    (+) neuromuscular disease,  anxiety/panic attacks,     Comments: Myofascial pain syndrome  Fibromyalgia  Chronic fatigue    Endo/Other    (+) arthritis,   (-) not pregnant     GI/Hepatic/Renal    (+) GERD,       Comments: incontinence          Dental - normal exam                        Anesthesia Plan  Planned anesthetic: MAC    ASA 2   Induction: intravenous   Anesthetic plan and risks discussed with: patient  Anesthesia plan special considerations: antiemetics,   Post-op plan: other (ALICIA orders)

## 2021-05-31 NOTE — PROGRESS NOTES
As I am leaving [facility] for private practice, I have a duty to inform my patients in a timely manner and to offer options for continuing care per the [employer] Code of Ethics. I have known that I will be leaving on August 5, 2019, since June 14, 2019, and I informed the administration of this clinic of this fact at that time. I have attempted on multiple occassions to get approval from the administration of this clinic to send a letter informing patients of this fact and of their options for care which would include both [facility] and [facility] locations. The approval to send the letter did not come until July 31, 2019, and I have been informed that the letter contains only [facility] options. While this does not meet my recommendation that the client be presented with at least three different clinics, one of which would be [facility], as options for continuing care, this is the choice of the organization. The patient will be sent this letter this week.    The specific date that this letter is sent may be discovered by searching for the letter in the patient's medical record.    As of this writing, I am considering the patient discharged from my care at [facility]. At close of treatment, the patient's condition was gradually improving due to good follow-through up until recent months. Treatment goals were partially met.

## 2021-05-31 NOTE — ANESTHESIA CARE TRANSFER NOTE
Last vitals:   Vitals:    09/03/19 1345   BP: (P) 130/61   Pulse: (P) 73   Resp: (P) 18   Temp: (P) 36.6  C (97.9  F)   SpO2: (!) (P) 88%     Patient's level of consciousness is drowsy  Spontaneous respirations: yes  Maintains airway independently: yes  Dentition unchanged: yes  Oropharynx: oropharynx clear of all foreign objects    QCDR Measures:  ASA# 20 - Surgical Safety Checklist: WHO surgical safety checklist completed prior to induction    PQRS# 430 - Adult PONV Prevention: 4558F - Pt received => 2 anti-emetic agents (different classes) preop & intraop  ASA# 8 - Peds PONV Prevention: NA - Not pediatric patient, not GA or 2 or more risk factors NOT present  PQRS# 424 - Nataliya-op Temp Management: 4559F - At least one body temp DOCUMENTED => 35.5C or 95.9F within required timeframe  PQRS# 426 - PACU Transfer Protocol: - Transfer of care checklist used  ASA# 14 - Acute Post-op Pain: ASA14B - Patient did NOT experience pain >= 7 out of 10

## 2021-05-31 NOTE — ANESTHESIA POSTPROCEDURE EVALUATION
Patient: Shyanne Gonzalez  SACRAL NEUROSTIMULATION STAGE I IMPLANT OF NEUROSTIMULATOR LEAD  Anesthesia type: MAC    Patient location: Phase II Recovery  Last vitals:   Vitals Value Taken Time   /68 9/3/2019  2:00 PM   Temp 36.6  C (97.9  F) 9/3/2019  1:45 PM   Pulse 71 9/3/2019  2:00 PM   Resp 18 9/3/2019  2:00 PM   SpO2 98 % 9/3/2019  2:00 PM     Post vital signs: stable  Level of consciousness: awake and responds to simple questions  Post-anesthesia pain: pain controlled  Post-anesthesia nausea and vomiting: no  Pulmonary: unassisted, return to baseline  Cardiovascular: stable and blood pressure at baseline  Hydration: adequate  Anesthetic events: no    QCDR Measures:  ASA# 11 - Nataliya-op Cardiac Arrest: ASA11B - Patient did NOT experience unanticipated cardiac arrest  ASA# 12 - Nataliya-op Mortality Rate: ASA12B - Patient did NOT die  ASA# 13 - PACU Re-Intubation Rate: NA - No ETT / LMA used for case  ASA# 10 - Composite Anes Safety: ASA10A - No serious adverse event    Additional Notes:

## 2021-05-31 NOTE — PROGRESS NOTES
"ASSESSMENT AND PLAN:  Shyanne Gonzalez 83 y.o. female is seen here on 08/15/19 for evaluation of painful joints, predominantly hands, wrists.  She has rheumatoid arthritis.  This is seropositive for rheumatoid factor.  Further work-up as noted.  Today I had a detailed discussion with her.  She is a firm believer off various natural remedies, kinesiology, kristin chi, yoga.  As we started talking about medications, with her hep B/C status pending, I asked her to start taking hydroxychloroquine.  She performed a quick procedure with her index finger and thumb interlocking, and decided based on her interpretation of this act (of \"kinesiology\") that she cannot take hydroxychloroquine.  She declines to take prednisone.  Major side effects from her ocular metabolic bone related reviewed.  Should she change her mind she will let us know, prednisone 10 mg daily can then be prescribed.  I have asked her to review methotrexate, leflunomide literature.  I have also given her hydroxychloroquine literature.  X-rays of the knees done today, personally reviewed the films, findings: Modest reduction in the joint spaces symmetrically in both the medial and lateral compartments with osteophyte formation.  There is no chondrocalcinosis.  We will check labs as noted.  We will meet here in the next 3 to 4 weeks.     Diagnoses and all orders for this visit:    Seropositive rheumatoid arthritis (H)  -     HM1(CBC and Differential)  -     Creatinine  -     ALT (SGPT)  -     Albumin  -     CCP Antibodies  -     Hepatitis C Antibody (Anti-HCV)  -     Erythrocyte Sedimentation Rate  -     C-Reactive Protein  -     Anti-Neutrophil Cytoplasmic Antibody, IgG (ANCA IFA)  -     Antinuclear Antibody (JACOBY) Cascade  -     Hepatitis B Surface Antigen (HBsAG)  -     XR Hands Bilateral 3 or More VWS; Future; Expected date: 08/15/2019  -     XR Knees Bilateral 1 Or 2 VWS; Future; Expected date: 08/15/2019  -     XR Hands Bilateral 3 or More VWS  -     XR " "Knees Bilateral 1 Or 2 VWS  -     HM1 (CBC with Diff)    Polyarthralgia  -     HM1(CBC and Differential)  -     Creatinine  -     ALT (SGPT)  -     Albumin  -     CCP Antibodies  -     Hepatitis C Antibody (Anti-HCV)  -     Erythrocyte Sedimentation Rate  -     C-Reactive Protein  -     Anti-Neutrophil Cytoplasmic Antibody, IgG (ANCA IFA)  -     Antinuclear Antibody (JACOBY) Cascade  -     Hepatitis B Surface Antigen (HBsAG)  -     XR Hands Bilateral 3 or More VWS; Future; Expected date: 08/15/2019  -     XR Knees Bilateral 1 Or 2 VWS; Future; Expected date: 08/15/2019  -     XR Hands Bilateral 3 or More VWS  -     XR Knees Bilateral 1 Or 2 VWS  -     HM1 (CBC with Diff)    Primary osteoarthritis involving multiple joints  -     XR Hands Bilateral 3 or More VWS; Future; Expected date: 08/15/2019  -     XR Knees Bilateral 1 Or 2 VWS; Future; Expected date: 08/15/2019  -     XR Hands Bilateral 3 or More VWS  -     XR Knees Bilateral 1 Or 2 VWS      HISTORY OF PRESENTING ILLNESS:  Shyanne SEBASTIAN Carlos, 83 y.o., female is here for painful joints.  This is in both her wrists.  This started in June.  This came on fairly quickly.  Her right hand and wrist were swollen and red.  She was seen at [facility] emergency room.  She had various labs drawn.  She was given \"an IV nonsteroidal\".  Since then she continues to hurt.  There are some days which are little better.  Now over the past few days her left wrist has also been hurting.  She has swelling on the volar aspect.  She has noted pain in her thumbs, the left one was red and swollen at one point.  She has had long-standing painful shoulders.  She is known to have glenohumeral osteoarthritis.  She has rotator cuff tendinopathy.  She has been seen and orthopedics.  She noted painful knees.  This is worse with activities.  She had swelling of the lower extremities.  She rates her symptoms as moderately severe to severe.  She reports that for the past 35years she has been " gradually eliminating gluten from her diet.  She is a practitioner of various modalities of treatment, including kristin chi, kinesiology.  She is a firm believer and natural remedies.  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  She lives by herself.  She feels that after the retreat when she returned home her own better quality diet has helped her joint symptoms.  She reports no personal family history of psoriasis ulcerative colitis Crohn's disease.  Her brother is noted to have celiac.  That is one reason she has cut back on gluten.  She neither smokes nor drinks. Further historical information and ADL limitations as noted in the multidimensional health assessment questionnaire attached in the EMR. Rest of the 13 system ROS is negative.     ALLERGIES:Gabapentin; Gluten; Latex; Neuromuscular blockers, steroidal; and Prednisone    PAST MEDICAL/ACTIVE PROBLEMS/MEDICATION/ FAMILY HISTORY/SOCIAL DATA:  The patient has a family history of  Past Medical History:   Diagnosis Date     Anxiety      Disc degeneration, lumbar      Fibromyalgia      Pulmonary fibrosis (H)      Social History     Tobacco Use   Smoking Status Never Smoker   Smokeless Tobacco Never Used     Patient Active Problem List   Diagnosis     Back Pain     Current Outpatient Medications   Medication Sig Dispense Refill     amitriptyline (ELAVIL) 10 MG tablet Take 20 mg by mouth at bedtime.       cholecalciferol, vitamin D3, (D3 DOTS) 2,000 unit Tab Take 5,000 Units by mouth daily.        clonazePAM (KLONOPIN) 0.5 MG tablet Take 0.5 mg by mouth 2 (two) times a day as needed for anxiety.       cyanocobalamin 1,000 mcg/mL injection Inject 1,000 mcg into the shoulder, thigh, or buttocks once.       magnesium citrate solution Take 325 mL by mouth once.        naproxen sodium (ALEVE) 220 MG tablet Take 220 mg by mouth 2 (two) times a day with meals.       No current facility-administered medications for this visit.        COMPREHENSIVE  "EXAMINATION:  Vitals:    08/15/19 0910   BP: 128/76   Patient Site: Left Arm   Patient Position: Sitting   Cuff Size: Adult Regular   Pulse: 68   Weight: 150 lb (68 kg)   Height: 5' 4\" (1.626 m)     A well appearing alert oriented female. Vital data as noted above. Her eyes without inflammation/scleromalacia. ENTwithout oral mucositis, thrush, nasal deformity, external ear redness, deformity. Her neck is without lymphadenopathy and supple. Lungs normal sounds, no pleural rub. Heart auscultation normal rate, rhythm; no pericardial rub and murmurs. Abdomen soft, non tender, no organomegaly. Skin examined for heliotrope, malar area eruption, lupus pernio, periungual erythema, sclerodactyly, papules, erythema nodosum, purpura, nail pitting, onycholysis, and obvious psoriasis lesion. Neurological examination shows normal alertness, speech, facial symmetry, tone and power in upper and lower extremities. The joint examination is performed for swelling, tenderness, warmth, erythema, and range of motion in the following joints: DIPs, PIPs, MCPs, wrists, first CMC's, elbows, shoulders, hips, knees, ankles, feet; spine for range of motion and paraspinal muscles for tenderness. The salient  findings are: She has exuberant synovitis in her wrists bilaterally, were worse on the volar aspect on the left side, dorsal aspect on the right side, she has synovitis in her MCP 1, 2 bilaterally, no other palpable joints show synovitis in upper and lower extremities.  She has impingement in her shoulders especially the right.  She has mild JLT of the knees.  There is no edema of the ankles..    LAB / IMAGING DATA:  ALT   Date Value Ref Range Status   05/16/2018 12 0 - 45 U/L Final   09/22/2015 13 0 - 45 U/L Final     Albumin   Date Value Ref Range Status   09/22/2015 3.5 3.5 - 5.0 g/dL Final   03/09/2015 3.5 3.5 - 5.0 g/dL Final     Creatinine   Date Value Ref Range Status   03/25/2018 0.82 0.60 - 1.10 mg/dL Final   02/01/2016 0.93 0.60 - " 1.10 mg/dL Final   09/22/2015 0.82 0.60 - 1.10 mg/dL Final       WBC   Date Value Ref Range Status   03/25/2018 7.3 4.0 - 11.0 thou/uL Final   02/01/2016 5.6 4.0 - 11.0 thou/uL Final     Hemoglobin   Date Value Ref Range Status   03/25/2018 13.0 12.0 - 16.0 g/dL Final   02/01/2016 13.6 12.0 - 16.0 g/dL Final     Platelets   Date Value Ref Range Status   03/25/2018 189 140 - 440 thou/uL Final   02/01/2016 190 140 - 440 thou/uL Final       Lab Results   Component Value Date    RF 46.6 (H) 08/08/2019

## 2021-06-01 ENCOUNTER — RECORDS - HEALTHEAST (OUTPATIENT)
Dept: ADMINISTRATIVE | Facility: CLINIC | Age: 86
End: 2021-06-01

## 2021-06-01 VITALS — BODY MASS INDEX: 25.08 KG/M2 | WEIGHT: 146.9 LBS | HEIGHT: 64 IN

## 2021-06-01 VITALS — BODY MASS INDEX: 26.95 KG/M2 | WEIGHT: 157 LBS

## 2021-06-01 NOTE — ANESTHESIA CARE TRANSFER NOTE
Last vitals:   Vitals:    09/10/19 1345   BP: 120/57   Pulse: 73   Resp: 16   Temp: 36.7  C (98.1  F)   SpO2: 94%     Patient's level of consciousness is awake  Spontaneous respirations: yes  Maintains airway independently: yes  Dentition unchanged: yes  Oropharynx: oropharynx clear of all foreign objects    QCDR Measures:  ASA# 20 - Surgical Safety Checklist: WHO surgical safety checklist completed prior to induction    PQRS# 430 - Adult PONV Prevention: 4558F - Pt received => 2 anti-emetic agents (different classes) preop & intraop  ASA# 8 - Peds PONV Prevention: NA - Not pediatric patient, not GA or 2 or more risk factors NOT present  PQRS# 424 - Nataliya-op Temp Management: 4559F - At least one body temp DOCUMENTED => 35.5C or 95.9F within required timeframe  PQRS# 426 - PACU Transfer Protocol: - Transfer of care checklist used  ASA# 14 - Acute Post-op Pain: ASA14B - Patient did NOT experience pain >= 7 out of 10

## 2021-06-01 NOTE — ANESTHESIA POSTPROCEDURE EVALUATION
Patient: Shyanne Gonzalez  STAGE II IMPLANT OF NEUROSTIMULATOR,  Anesthesia type: MAC    Patient location: PACU  Last vitals:   Vitals Value Taken Time   /60 9/10/2019  2:45 PM   Temp 36.7  C (98.1  F) 9/10/2019  1:45 PM   Pulse 84 9/10/2019  2:54 PM   Resp 16 9/10/2019  1:45 PM   SpO2 96 % 9/10/2019  2:54 PM   Vitals shown include unvalidated device data.  Post vital signs: stable  Level of consciousness: awake and responds to simple questions  Post-anesthesia pain: pain controlled  Post-anesthesia nausea and vomiting: no  Pulmonary: unassisted, return to baseline  Cardiovascular: stable and blood pressure at baseline  Hydration: adequate  Anesthetic events: no    QCDR Measures:  ASA# 11 - Nataliya-op Cardiac Arrest: ASA11B - Patient did NOT experience unanticipated cardiac arrest  ASA# 12 - Nataliya-op Mortality Rate: ASA12B - Patient did NOT die  ASA# 13 - PACU Re-Intubation Rate: NA - No ETT / LMA used for case  ASA# 10 - Composite Anes Safety: ASA10A - No serious adverse event    Additional Notes:

## 2021-06-02 ENCOUNTER — RECORDS - HEALTHEAST (OUTPATIENT)
Dept: ADMINISTRATIVE | Facility: CLINIC | Age: 86
End: 2021-06-02

## 2021-06-03 VITALS — WEIGHT: 147.4 LBS | HEIGHT: 64 IN | BODY MASS INDEX: 25.16 KG/M2

## 2021-06-03 VITALS — WEIGHT: 150 LBS | BODY MASS INDEX: 25.75 KG/M2

## 2021-06-03 VITALS — HEIGHT: 64 IN | BODY MASS INDEX: 25.61 KG/M2 | WEIGHT: 150 LBS

## 2021-06-03 VITALS
SYSTOLIC BLOOD PRESSURE: 130 MMHG | TEMPERATURE: 98.2 F | DIASTOLIC BLOOD PRESSURE: 52 MMHG | HEART RATE: 77 BPM | RESPIRATION RATE: 20 BRPM | WEIGHT: 158 LBS | OXYGEN SATURATION: 95 % | BODY MASS INDEX: 27.12 KG/M2

## 2021-06-08 NOTE — PROGRESS NOTES
MENTAL HEALTH VISIT NOTE    Date of Service: 2/6/2017    Start time:1:00 pm  Stop time:2:08 pm  This is session number 2 this calendar year.  The patient was seen for individual psychotherapy    No  was required because the patient speaks and understands English.  Shyanne Gonzalez is a 81 y.o. female is being seen today for individual psychotherapy to address the following:    Chief Complaint   Patient presents with     Depression     Anxiety   .     NECESSITY: This individual session was necessary for the care of the patient to address difficulties in psychosocial functioning that are affected by and affect the patient's ability to cope with and heal from spinal conditions and are affected by her mental health diagnosis listed in the diagnosis section below.    New symptoms or complaints: None    Functional Impairment (0-4):   Personal: 2  Family: 0  Work: 2  Social:3    Clinical Assessment of Mental Status  Mood: Euthymic    Affect:   Congruent with content of speech    Appearance:  well groomed,, casually-dressed,  and engaged,    Speech:  Within normal limits    Orientation:   Oriented to person, time, and place    Memory:  No evidence of impairment.    Concentration:  Within normal limits    Focus:   Distractible    Fund of knowledge:  adequate    Suicidal Ideation present:   Absent  Suicidal Risk Assessment:  No specific plan to harm self  No intention or plan.  Patient is aware of resources available if she experiences suicidal ideation.      Homicidal Risk Assessment:   None reported  No crisis plan required due to absence of risk.    Patient's impression of their current status:  Patient believes symptoms are improving overall.  Patient has found the following skill particularly helpful: , breathing, talking back to negative thoughts and Patient believes psychotherapy sessions continue to be helpful in maintaining skills and insight acquired over past sessions.     Therapist impression of  patient's current status:  Symptoms  are improving overall.  Patient is effectively using the following skill:  identifying unhelpful thought patterns     Type of psychotherapeutic technique provided:  Cognitive Behavioral Therapy  Client-centered therapy  Techniques used:  Problem identification  Goal Setting   Identification of emotions.  Identification of automatic thoughts , assessment of sleep issue, clinical hypnosis focused on metaphor of  relaxation dial , motivational interviewing    Response to psychotherapeutic interventions:  Patient responded to interventions in the following manner: Enthusiastic    Progress toward short term goals:  Progress as expected: Patient is practicing skills taught in psychotherapy and seeing benefits.    Review of long-term goals:  Treatment plan updated to read as follows:     1. Increase sense of self worth from 7/10 to 9/10.   2. Develop a greater acceptance of leadership decisions.  3.  Improve ability to get good sleep from one night out of the week to four nights per week.  Diagnosis:   1. Major depressive disorder, recurrent episode, moderate degree    2. Generalized anxiety disorder      Plan and Follow-Up:  Patient will return for follow-up psychotherapy session in two weeks    Patient will complete the following homework before our next session:  use recording to practice self-hypnosis    Discharge Criteria/ Planning:  Patient will continue with follow-up until therapy can be discontinued without return of symptoms. She is interested in learning to use hypnosis when suffering from tinnitus.      Candelaria Barr 2/6/2017      This note was created with help of Dragon dictation software. Grammatical / typing errors are not intentional and inherent to the software.

## 2021-06-08 NOTE — PROGRESS NOTES
MENTAL HEALTH VISIT NOTE    Date of Service: 1/23/2017    Start time:1:00 pm  Stop time:1:55 pm  This is session number 1 this calendar year.  The patient was seen for individual psychotherapy    No  was required because the patient speaks and understands English.  Shyanne Gonzalez is a 81 y.o. female is being seen today for individual psychotherapy to address the following:    Chief Complaint   Patient presents with     Depression     Anxiety   .     NECESSITY: This individual session was necessary for the care of the patient to address difficulties in psychosocial functioning that are affected by and affect the patient's ability to cope with and heal from spinal conditions and are affected by her mental health diagnosis listed in the diagnosis section below.    New symptoms or complaints: None    Functional Impairment (0-4):   Personal: 2  Family: 1  Work: 1  Social:3    Clinical Assessment of Mental Status  Mood: Euthymic    Affect:   Congruent with content of speech    Appearance:  well groomed,, casually-dressed,  and engaged,    Speech:  Within normal limits    Orientation:   Oriented to person, time, and place    Memory:  No evidence of impairment.    Concentration:  Within normal limits    Focus:   Within normal limits    Fund of knowledge:  adequate    Suicidal Ideation present:   Absent  Suicidal Risk Assessment:  No specific plan to harm self  No intention or plan.  Patient is aware of resources available if she experiences suicidal ideation.      Homicidal Risk Assessment:   None reported  No crisis plan required due to absence of risk.    Patient's impression of their current status:  Patient believes symptoms are improving overall.  Patient reports improvements in functioning in the following area: , better understanding the source of her anger and what triggers her now and Patient believes psychotherapy sessions continue to be helpful in maintaining skills and insight acquired over past  sessions.     Therapist impression of patient's current status:  Symptoms  are improving overall.  Patient is effectively using the following skill:  understanding core and compensatory beliefs and role of family of origin regarding her anger and feelings of inadequacy     Type of psychotherapeutic technique provided:  Cognitive Behavioral Therapy  Client-centered therapy  Techniques used:  Identification of automatic thoughts , identification of core and compensatory beliefs, clinical hypnosis for growing feelings of comfort , motivational interviewing , guided discovery    Response to psychotherapeutic interventions:  Patient responded to interventions in the following manner: Enthusiastic    Progress toward short term goals:  Progress as expected: Patient is practicing skills taught in psychotherapy and seeing benefits.    Review of long-term goals:  Not done at today's visit. We discussed setting goals in the area of better managing anger, tinnitus, and depression.    Diagnosis:   1. Major depressive disorder, recurrent episode, moderate degree    2. Generalized anxiety disorder      Plan and Follow-Up:  Patient will return for follow-up psychotherapy session in two weeks    Patient will complete the following homework before our next session:  Patient indicated that the patient  would continue to use mind-body self care techniques and journal for homework.     Discharge Criteria/ Planning:  Patient will continue with follow-up until therapy can be discontinued without return of symptoms. She would like to make a recording via audiotape of clinical hypnosis next time and would like to work on better managing her anxiety so she can wean off klonopin.      Candelaria Brar 1/23/2017      This note was created with help of Dragon dictation software. Grammatical / typing errors are not intentional and inherent to the software.

## 2021-06-08 NOTE — PROGRESS NOTES
[facility] Treatment Plan - Update          Name: Shyanne Gonzalez  : 1935  MRN: [id number]      Treatment Plan: Initial Treatment Plan  Intake/initial treatment plan date: 2016  Updated :17  Benefit and risks and alternatives have been discussed: Yes  Is this treatment appropriate with minimal intrusion/restrictions: Yes  Estimated duration of treatment: Approximately 20 sessions.  Anticipated frequency of services: Every 2 weeks  Necessity for frequency: This frequency is needed to establish therapeutic goals and for continuity of care in order to monitor progress.  Necessity for treatment: To address cognitive, behavioral, and/or emotional barriers in order to work toward goals and to improve quality of life.          Plan:   1. Problem:  ? Depression   Goal: Increase sense of self worth from 7/10 to 9/10.   Strategies: ?[x] Decrease social isolation  [x] Increase involvement in meaningful activities  ?[] Discuss sleep hygiene  ?[x] Explore thoughts and expectations about self and others  ?[x] Process grief (loss of significant person, independence, role, etc.)  ?[] Assess for suicide risk  ?[x] Implement physical activity routine (with physician approval)  [x] Consider introduction of bibliotherapy and/or videos  [x] Continue compliance with medical treatment plan (or explore Barriers)  [x] Use narrative techniques and writing.      ?   Degree to which this is a problem (1-4): 2  Degree to which goal is met (1-4) 3  Date goal was initiated: 2016  Date of Review: 2016  Treatment Goal Status:continued, but revised as follows:changed baseline level to 7/10    Date of Review: 2017  Treatment Goal Status:continued as written          2. Problem:      ?   ? Anxiety   Goal: Develop a greater acceptance of leadership decisions.  Strategies: ? [x]Learn and practice relaxation techniques and other coping strategies (e.g., thought stopping, reframing, meditation)  ? [x]  Increase involvement in meaningful activities  ? [] Discuss sleep hygiene  ? [x] Explore thoughts and expectations about self and others  ? [x] Identify and monitor triggers for panic/anxiety symptoms  ? [x] Implement physical activity routine (with physician approval)  ? [x] Consider introduction of bibliotherapy and/or videos  ? [x] Continue compliance with medical treatment plan (or explore barriers)   [x] Clinical hypnosis      Degree to which this is a problem (1-4): 3  Degree to which goal is met (1-4)2  Date goal was initiated: 5/16/2016  Date of Review: 8/22/2016  Treatment Goal Status:continued as written    Date of Review: 2/6/2017  Treatment Goal Status:continued, but revised as follows:focus on acceptance        Problem:     ? 3. Depression/Sleep    Goal:  Improve ability to get good sleep from one night out of the week to four nights per week.   Strategies:    ?[x] Decrease social isolation     [x] Increase involvement in meaningful activities     ?[x] Discuss sleep hygiene     ?[x] Explore thoughts and expectations about self and others     ?[x] Process grief (loss of significant person, independence, role,        etc.)     ?[] Assess for suicide risk     ?[] Use clinical hypnosis     [x] Consider introduction of bibliotherapy and/or videos     [x] Continue compliance with medical treatment plan (or explore         barriers)       ?    Degree to which this is a problem (1-4): 4  Degree to which goal is met (1-4)0  Date goal was initiated: 2/6/2017  Functional Impairment: 1=Not at all/Rarely 2=Some days 3=Most Days 4=Every Day      Personal: 2  Family: 1  Social: 3  Work: 1      Diagnosis:  Major Depressive Disorder, Moderate, Recurrent  Generalized Anxiety Disorder      Strengths: ability for insight, average or above intelligence, capable of independent living, communication skills, general fund of knowledge, motivation for treatment/growth, patient is voluntary, is willing to work on problems,  Jew affiliation, special hobby/interest, work skills   Limitations: The patient has the following limitations: , insufficient support network and chronic pain   Cultural Considerations: Jew: spiritual concerns / distress, general cultural considerations: member of Jew order; racial/ethnic background:           Persons responsible for this plan:  ? [x] Patient ? [x] Provider ? [] Other: __________________          Provider: Performed and documented by Candelaria Barr [facility] 2/6/17                     Patient Signature:____________________________________ Date: ______________           Therapist Signature: __________________________________ Date: ______________

## 2021-06-09 NOTE — PROGRESS NOTES
MENTAL HEALTH VISIT NOTE    Date of Service: 2/21/2017    Start time:2:10 pm  Stop time:3:05 pm  This is session number 3 this calendar year.  The patient was seen for individual psychotherapy    No  was required because the patient speaks and understands English.  Shyanne Gonzalez is a 81 y.o. female is being seen today for individual psychotherapy to address the following:    Chief Complaint   Patient presents with     Depression     Anxiety   .     NECESSITY: This individual session was necessary for the care of the patient to address difficulties in psychosocial functioning that are affected by and affect the patient's ability to cope with and heal from spinal conditions and are affected by her mental health diagnosis listed in the diagnosis section below.    New symptoms or complaints: None    Functional Impairment (0-4):   Personal: 2  Family: 1  Work: 2  Social:2    Clinical Assessment of Mental Status  Mood: Euthymic    Affect:   Congruent with content of speech    Appearance:  well groomed,, casually-dressed,  and engaged,    Speech:  Within normal limits    Orientation:   Oriented to person, time, and place    Memory:  No evidence of impairment.    Concentration:  Within normal limits    Focus:   Within normal limits    Fund of knowledge:  adequate    Suicidal Ideation present:   Absent  Suicidal Risk Assessment:  No specific plan to harm self  No intention or plan.  Patient is aware of resources available if she experiences suicidal ideation.      Homicidal Risk Assessment:   None reported  No crisis plan required due to absence of risk.    Patient's impression of their current status:  Patient believes symptoms are improving overall. She is feeling better and is engaging more socially.  Patient believes psychotherapy sessions continue to be helpful in maintaining skills and insight acquired over past sessions.     Therapist impression of patient's current status:  Symptoms  are improving  overall.  The following symptoms are improving:  insight into why others responded to her as they did in the past, insight into how she pushes herself too hard      Type of psychotherapeutic technique provided:  Cognitive Behavioral Therapy  Client-centered therapy  Techniques used:  Pacing , motivational interviewing , guided discovery    Response to psychotherapeutic interventions:  Patient responded to interventions in the following manner: Enthusiastic    Progress toward short term goals:  Progress as expected: Patient is practicing skills taught in psychotherapy and seeing benefits.    Review of long-term goals:  Not done at today's visit. Tx Plan updated 2/6/17 :  1. Increase sense of self worth from 7/10 to 9/10.   2. Develop a greater acceptance of leadership decisions.  3.  Improve ability to get good sleep from one night out of the week to four nights per week.    Diagnosis:   1. Major depressive disorder, recurrent episode, moderate degree    2. Generalized anxiety disorder      Plan and Follow-Up:  Patient will return for follow-up psychotherapy session in two weeks    Patient will complete the following homework before our next session:  practice pacing and being in tune with her body's messages    Discharge Criteria/ Planning:  Patient will continue with follow-up until therapy can be discontinued without return of symptoms.      Candelaria Barr 2/21/2017      This note was created with help of Dragon dictation software. Grammatical / typing errors are not intentional and inherent to the software.

## 2021-06-09 NOTE — PROGRESS NOTES
MENTAL HEALTH VISIT NOTE    Date of Service: 3/30/2017    Start time:11:00 am  Stop time:12:00 pm  This is session number 5 this calendar year.  The patient was seen for individual psychotherapy    No  was required because the patient speaks and understands English.  Shyanne Gonzalez is a 81 y.o. female is being seen today for individual psychotherapy to address the following:    Chief Complaint   Patient presents with     Depression     Anxiety   .     NECESSITY: This individual session was necessary for the care of the patient to address difficulties in psychosocial functioning that are affected by and affect the patient's ability to cope with and heal from spinal conditions and are affected by her mental health diagnosis listed in the diagnosis section below.    New symptoms or complaints: None    Functional Impairment (0-4):   Personal: 2  Family: 1  Work: 2  Social:3    Clinical Assessment of Mental Status  Mood: Sad    Affect:   Congruent with content of speech    Appearance:  well groomed,, casually-dressed,  and engaged,    Speech:  Within normal limits    Orientation:   Oriented to person, time, and place    Memory:  No evidence of impairment.    Concentration:  Within normal limits    Focus:   Within normal limits    Fund of knowledge:  adequate    Suicidal Ideation present:   Absent  Suicidal Risk Assessment:  No specific plan to harm self  No intention or plan.  Patient is aware of resources available if she experiences suicidal ideation.      Homicidal Risk Assessment:   None reported  No crisis plan required due to absence of risk.    Patient's impression of their current status:  Patient believes symptoms are improving overall.  Patient has found the following skill particularly helpful:  Self-care strategies. She is experiencing more urgency to get ready to move - several deaths and move-outs in her building are causing this. and Patient believes psychotherapy sessions continue to be  helpful in maintaining skills and insight acquired over past sessions.     Therapist impression of patient's current status:  Symptoms  are improving overall.  Patient has experienced improvements in functioning in the following area:  close friendships     Type of psychotherapeutic technique provided:  Cognitive Behavioral Therapy  Client-centered therapy  Techniques used:  Identification of emotions.  , motivational interviewing , guided discovery    Response to psychotherapeutic interventions:  Patient responded to interventions in the following manner: Enthusiastic    Progress toward short term goals:  Progress as expected: Patient is practicing skills taught in psychotherapy and seeing benefits.    Review of long-term goals:  Not done at today's visit. Tx Plan updated 2/6/17 :  1. Increase sense of self worth from 7/10 to 9/10.   2. Develop a greater acceptance of leadership decisions.  3.  Improve ability to get good sleep from one night out of the week to four nights per week.    Diagnosis:   1. Major depressive disorder, recurrent episode, moderate degree    2. Generalized anxiety disorder      Plan and Follow-Up:  Patient will return for follow-up psychotherapy session in two weeks    Patient will complete the following homework before our next session:  complete EMDR assessments, follow up with functional medicine docs and sleep study    Discharge Criteria/ Planning:  Patient will continue with follow-up until therapy can be discontinued without return of symptoms. We will assess for suitability of doing EMDR for validation of emotions, connected to an event regarding her childhood and themes that continued throughout her life, especially regarding leadership in the [employer].      Candelaria Barr 3/30/2017      This note was created with help of Dragon dictation software. Grammatical / typing errors are not intentional and inherent to the software.

## 2021-06-09 NOTE — PROGRESS NOTES
MENTAL HEALTH VISIT NOTE    Date of Service: 3/6/2017    Start time:1:05 pm  Stop time:2:05 pm  This is session number 4 this calendar year.  The patient was seen for individual psychotherapy    No  was required because the patient speaks and understands English.  Shyanne Gonzalez is a 81 y.o. female is being seen today for individual psychotherapy to address the following:    Chief Complaint   Patient presents with     Depression     Anxiety   .     NECESSITY: This individual session was necessary for the care of the patient to address difficulties in psychosocial functioning that are affected by and affect the patient's ability to cope with and heal from spinal conditions and are affected by her mental health diagnosis listed in the diagnosis section below.    New symptoms or complaints: None    Functional Impairment (0-4):   Personal: 2  Family: 2  Work: 2  Social:2    Clinical Assessment of Mental Status  Mood: Euthymic    Affect:   Congruent with content of speech    Appearance:  well groomed,, casually-dressed,  and engaged,    Speech:  Within normal limits    Orientation:   Oriented to person, time, and place    Memory:  No evidence of impairment.    Concentration:  Within normal limits    Focus:   Within normal limits    Fund of knowledge:  adequate    Suicidal Ideation present:   Absent  Suicidal Risk Assessment:  No specific plan to harm self  No intention or plan.  Patient is aware of resources available if she experiences suicidal ideation.      Homicidal Risk Assessment:   None reported  No crisis plan required due to absence of risk.    Patient's impression of their current status:  Patient believes symptoms are improving overall.  Patient has found the following skill particularly helpful: , identifying self-care resources and Patient believes psychotherapy sessions continue to be helpful in maintaining skills and insight acquired over past sessions.     Therapist impression of patient's  current status:  Symptoms  are improving overall.  Patient is effectively using the following skill:  identifying the larger/past wound behind current feelings of distress     Type of psychotherapeutic technique provided:  Cognitive Behavioral Therapy  Client-centered therapy  Techniques used:  , motivational interviewing , guided discovery    Response to psychotherapeutic interventions:  Patient responded to interventions in the following manner: Accepting    Progress toward short term goals:  Progress as expected: Patient is practicing skills taught in psychotherapy and seeing benefits.    Review of long-term goals:  Not done at today's visit. Tx Plan updated 2/6/17 :  1. Increase sense of self worth from 7/10 to 9/10.   2. Develop a greater acceptance of leadership decisions.  3.  Improve ability to get good sleep from one night out of the week to four nights per week.    Diagnosis:   1. Major depressive disorder, recurrent episode, moderate degree    2. Generalized anxiety disorder      Plan and Follow-Up:  Patient will return for follow-up psychotherapy session in one month    Patient will complete the following homework before our next session:  Patient indicated that the patient  would continue self-care regimine, including her mind-body techniques, for homework.     Discharge Criteria/ Planning:  Patient will continue with follow-up until therapy can be discontinued without return of symptoms.      Candelaria Barr 3/6/2017      This note was created with help of Dragon dictation software. Grammatical / typing errors are not intentional and inherent to the software.

## 2021-06-10 NOTE — PROGRESS NOTES
MENTAL HEALTH VISIT NOTE    Date of Service: 5/9/2017    Start time:2:10 pm  Stop time:3:00 pm  This is session number 6 this calendar year.  The patient was seen for individual psychotherapy    No  was required because the patient speaks and understands English.  Shyanne Gonzalez is a 81 y.o. female is being seen today for individual psychotherapy to address the following:    Chief Complaint   Patient presents with     Depression     Anxiety     Pain   .     NECESSITY: This individual session was necessary for the care of the patient to address difficulties in psychosocial functioning that are affected by and affect the patient's ability to cope with and heal from spinal conditions and are affected by her mental health diagnosis listed in the diagnosis section below.    New symptoms or complaints: None    Functional Impairment (0-4):   Personal: 4  Family: 2  Work: 2  Social:3    Clinical Assessment of Mental Status  Mood: Euthymic and Anxious    Affect:   Congruent with content of speech    Appearance:  well-groomed, casually dressed, engaged    Speech:  Within normal limits    Orientation:   Oriented to person, time, and place    Memory:  No evidence of impairment.    Concentration:  Within normal limits    Focus:   Distractible    Fund of knowledge:  adequate    Suicidal Ideation present:   Absent  Suicidal Risk Assessment:  No specific plan to harm self.  Patient does not endorse any intention to harm self.  Patient is aware of resources available if she experiences suicidal ideation.      Homicidal Risk Assessment:   None reported  No crisis plan required due to absence of risk.    Patient's impression of their current status:  Patient believes symptoms are improving overall.  Patient believes psychotherapy sessions continue to be helpful in maintaining skills and insight acquired over past sessions. She is eager to recommit to therapy and manage her stress because she sees it as very connected to  her health problems now.    Therapist impression of patient's current status:  Symptoms  are improving overall.  The patient continues to build skills to manage mental health symptoms and pain. She has been going to a naturopath and better acknowledging the connection between her pain and stress.     Type of psychotherapeutic technique provided:  motivational interviewing, completed EMDR assessments, began treatment planning for EMDR    Response to psychotherapeutic interventions:  Patient responded to interventions in the following manner: Enthusiastic    Progress toward short term goals:  Progress as expected: Patient is practicing skills taught in psychotherapy and seeing benefits.    Review of long-term goals:  Not done at today's visit. we discussed possibly setting goals that would include reducing the amount of time she spends ruminating about leadership and her anger about it from 90% of the time to 30% of the time.  To reduce the distress associated with her anger and feeling devalued from 10 out of 10 to 1 out of 10.  We will finalize these goals next session.    Diagnosis:   1. Major depressive disorder, recurrent episode, moderate degree    2. Generalized anxiety disorder      Plan and Follow-Up:  Patient will return for follow-up psychotherapy session in one week    Patient will complete the following homework before our next session:  Patient plans to continue working with above-mentioned skills. she will consider her negative cognitions that are associated with these issues that we identified as targets.    Discharge Criteria/ Planning:  Patient will continue with follow-up until therapy can be discontinued without return of symptoms.      Candelaria Barr 5/9/2017      This note was created with help of Dragon dictation software. Grammatical / typing errors are not intentional and inherent to the software.

## 2021-06-10 NOTE — PROGRESS NOTES
MENTAL HEALTH VISIT NOTE    Date of Service: 5/22/2017    Start time:2:00 pm  Stop time:2:58 pm  This is session number 7 this calendar year.  The patient was seen for individual psychotherapy    No  was required because the patient speaks and understands English.  Shyanne Gonzalez is a 81 y.o. female is being seen today for individual psychotherapy to address the following:    Chief Complaint   Patient presents with     Depression     Pain     Anxiety   .     NECESSITY: This individual session was necessary for the care of the patient to address difficulties in psychosocial functioning that are affected by and affect the patient's ability to cope with and heal from spinal conditions and are affected by her mental health diagnosis listed in the diagnosis section below.    New symptoms or complaints: She is currently being treated for Yrn-Linda, and she feels that this treatment is making a difference in her energy levels.    Functional Impairment (0-4):   Personal: 3  Family: 2  Work: 3  Social:3    Clinical Assessment of Mental Status  Mood: Euthymic    Affect:   Congruent with content of speech    Appearance:  well-groomed, casually dressed, engaged    Speech:  Within normal limits    Orientation:   Oriented to person, time, and place    Memory:  No evidence of impairment.    Concentration:  Within normal limits    Focus:   Within normal limits    Fund of knowledge:  adequate    Suicidal Ideation present:   Absent  Suicidal Risk Assessment:  No specific plan to harm self.  Patient does not endorse any intention to harm self.  Patient is aware of resources available if she experiences suicidal ideation.      Homicidal Risk Assessment:   None reported  No crisis plan required due to absence of risk.    Patient's impression of their current status:  Patient believes symptoms are improving overall.  Patient believes psychotherapy sessions continue to be helpful in maintaining skills and insight acquired  over past sessions. 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  She states that she has found ways to begin to accept some of the things she cannot change.    Therapist impression of patient's current status:  Symptoms  are improving overall.  The patient continues to build skills to manage mental health symptoms and pain. she has more positive outlook overall and is making progress in accepting help and being connected to others.    Type of psychotherapeutic technique provided:  motivational interviewing, EMDR treatment planning and resourcing exercise of the container    Response to psychotherapeutic interventions:  Patient responded to interventions in the following manner: Enthusiastic    Progress toward short term goals:  Progress as expected: Patient is practicing skills taught in psychotherapy and seeing benefits.    Review of long-term goals:  Not done at today's visit. Tx Plan updated 2/6/17 :  1. Increase sense of self worth from 7/10 to 9/10.   2. Develop a greater acceptance of leadership decisions.  3.  Improve ability to get good sleep from one night out of the week to four nights per week.    Diagnosis:   1. Major depressive disorder, recurrent episode, moderate degree    2. Generalized anxiety disorder      Plan and Follow-Up:  Patient will return for follow-up psychotherapy session in one week    Patient will complete the following homework before our next session:  Patient plans to continue working with above-mentioned skills. she will especially focus on using the container exercise    Discharge Criteria/ Planning:  Patient will continue with follow-up until therapy can be discontinued without return of symptoms.      Candelaria Barr 5/22/2017      This note was created with help of Dragon dictation software. Grammatical / typing errors  are not intentional and inherent to the software.

## 2021-06-11 NOTE — PROGRESS NOTES
MENTAL HEALTH VISIT NOTE    Date of Service: 6/27/2017    Start time:10:05 am  Stop time:11:00 am  This is session number 8 this calendar year.  The patient was seen for individual psychotherapy    No  was required because the patient speaks and understands English.  Shyanne Gonzalez is a 81 y.o. female is being seen today for individual psychotherapy to address the following:    Chief Complaint   Patient presents with     Depression     Anxiety   .     NECESSITY: This individual session was necessary for the care of the patient to address difficulties in psychosocial functioning that are affected by and affect the patient's ability to cope with and heal from spinal conditions and are affected by her mental health diagnosis listed in the diagnosis section below.    New symptoms or complaints: None    Functional Impairment (0-4):   Personal: 2  Family: 0  Work: 2  Social:3    Clinical Assessment of Mental Status  Mood: Euthymic    Affect:   Congruent with content of speech    Appearance:  well-groomed, casually dressed, engaged    Speech:  Within normal limits    Orientation:   Oriented to person, time, and place    Memory:  No evidence of impairment.    Concentration:  Within normal limits    Focus:   Within normal limits    Fund of knowledge:  adequate    Suicidal Ideation present:   Absent  Suicidal Risk Assessment:  No specific plan to harm self.  Patient does not endorse any intention to harm self.  Patient is aware of resources available if she experiences suicidal ideation.      Homicidal Risk Assessment:   None reported  No crisis plan required due to absence of risk.    Patient's impression of their current status:  Patient believes symptoms are improving overall.  Patient believes psychotherapy sessions continue to be helpful in maintaining skills and insight acquired over past sessions. she is feeling more at peace with the treatment that she has had from leadership.    Therapist impression of  patient's current status:  Symptoms  are improving overall.  The patient continues to build skills to manage mental health symptoms and pain. she continues to focus excessively on her health problems and symptoms.  She is feeling good about the services that she is connected with right now, so that is reassuring.      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    Response to psychotherapeutic interventions:  Patient responded to interventions in the following manner: Enthusiastic, we completed that target to her satisfaction.    Progress toward short term goals:  Progress as expected: Patient is practicing skills taught in psychotherapy and seeing benefits.    Review of long-term goals:  Not done at today's visit. Tx Plan updated 2/6/17 :  1. Increase sense of self worth from 7/10 to 9/10.   2. Develop a greater acceptance of leadership decisions.  3.  Improve ability to get good sleep from one night out of the week to four nights per week.    Diagnosis:   1. Major depressive disorder, recurrent episode, moderate degree    2. Generalized anxiety disorder      Plan and Follow-Up:  Patient will return for follow-up psychotherapy session in one week    Patient will complete the following homework before our next session:  Patient plans to continue working with above-mentioned skills.    Discharge Criteria/ Planning:  Patient will continue with follow-up until therapy can be discontinued without return of symptoms. she would like to work on some of the attachment issues from her childhood      Candelaria Barr 6/27/2017      This note was created with help of Dragon dictation software. Grammatical / typing errors are not intentional and inherent to the software.

## 2021-06-11 NOTE — PROGRESS NOTES
MENTAL HEALTH VISIT NOTE    Date of Service: 6/6/2017    Start time:10:00 am  Stop time:11:00 am  This is session number 8 this calendar year.  The patient was seen for individual psychotherapy    No  was required because the patient speaks and understands English.  Shyanne Gonzalez is a 81 y.o. female is being seen today for individual psychotherapy to address the following:    Chief Complaint   Patient presents with     Depression     Anxiety     Pain   .     NECESSITY: This individual session was necessary for the care of the patient to address difficulties in psychosocial functioning that are affected by and affect the patient's ability to cope with and heal from spinal conditions and are affected by her mental health diagnosis listed in the diagnosis section below.    New symptoms or complaints: None    Functional Impairment (0-4):   Personal: 3  Family: 1  Work: 2  Social:2    Clinical Assessment of Mental Status  Mood: Euthymic    Affect:   Congruent with content of speech    Appearance:  well-groomed, casually dressed, engaged    Speech:  Within normal limits    Orientation:   Oriented to person, time, and place    Memory:  No evidence of impairment.    Concentration:  Within normal limits    Focus:   Within normal limits    Fund of knowledge:  adequate    Suicidal Ideation present:   Absent  Suicidal Risk Assessment:  No specific plan to harm self.  Patient does not endorse any intention to harm self.  Patient is aware of resources available if she experiences suicidal ideation.      Homicidal Risk Assessment:   None reported  No crisis plan required due to absence of risk.    Patient's impression of their current status:  Patient believes symptoms are improving overall.  Patient believes psychotherapy sessions continue to be helpful in maintaining skills and insight acquired over past sessions. she feels that she has obtained closure for 1 of the problems that she has been dealing with in  relationship to her treatment by the leadership of her community.    Therapist impression of patient's current status:  Symptoms  are improving overall.  The patient continues to build skills to manage mental health symptoms and pain. she remains engaged in alternative medicine and is feeling quite optimistic about her chances for recovery from her various ailments.    Type of psychotherapeutic technique provided:  EMDR desensitization process on the memory of the treatment by leadership    Response to psychotherapeutic interventions:  Patient responded to interventions in the following manner: Enthusiastic, we reduced her suds level from a 7 out of 10 to a 3 out of 10 in regards to this particular memory.  At the end of the session she was feeling that the behavior of others was really quite laughable.    Progress toward short term goals:  Progress as expected: Patient is practicing skills taught in psychotherapy and seeing benefits.    Review of long-term goals:  Not done at today's visit. Tx Plan updated 2/6/17 :  1. Increase sense of self worth from 7/10 to 9/10.   2. Develop a greater acceptance of leadership decisions.  3.  Improve ability to get good sleep from one night out of the week to four nights per week.    Diagnosis:   1. Major depressive disorder, recurrent episode, moderate degree    2. Generalized anxiety disorder      Plan and Follow-Up:  Patient will return for follow-up psychotherapy session in one week    Patient will complete the following homework before our next session:  Patient plans to continue working with above-mentioned skills.    Discharge Criteria/ Planning:  Patient will continue with follow-up until therapy can be discontinued without return of symptoms.      Candelaria Barr 6/6/2017      This note was created with help of Dragon dictation software. Grammatical / typing errors are not intentional and inherent to the software.

## 2021-06-11 NOTE — PROGRESS NOTES
MENTAL HEALTH VISIT NOTE    Date of Service: 7/10/2017    Start time:3:00 pm  Stop time:4:00 pm  This is session number 9 this calendar year.  The patient was seen for individual psychotherapy    No  was required because the patient speaks and understands English.  Shyanne Gonzalez is a 81 y.o. female is being seen today for individual psychotherapy to address the following:    Chief Complaint   Patient presents with     Depression     Anxiety     Pain   .     NECESSITY: This individual session was necessary for the care of the patient to address difficulties in psychosocial functioning that are affected by and affect the patient's ability to cope with and heal from spinal conditions and are affected by her mental health diagnosis listed in the diagnosis section below.    New symptoms or complaints: None    Functional Impairment (0-4):   Personal: 2  Family: 1  Work: 2  Social:3    Clinical Assessment of Mental Status  Mood: Euthymic and Anxious    Affect:   Congruent with content of speech    Appearance:  well-groomed, casually dressed, engaged    Speech:  Within normal limits    Orientation:   Oriented to person, time, and place    Memory:  No evidence of impairment.    Concentration:  Within normal limits    Focus:   Within normal limits    Fund of knowledge:  adequate    Suicidal Ideation present:   Absent  Suicidal Risk Assessment:  No specific plan to harm self.  Patient does not endorse any intention to harm self.  Patient is aware of resources available if she experiences suicidal ideation.      Homicidal Risk Assessment:   None reported  No crisis plan required due to absence of risk.    Patient's impression of their current status:  Patient believes symptoms are improving overall.  Patient believes psychotherapy sessions continue to be helpful in maintaining skills and insight acquired over past sessions. she is feeling better overall, particularly in regards to her physical health.  She is  finding herself more sympathetic to the leadership, which she believes is an improvement.    Therapist impression of patient's current status:  Symptoms  are improving overall.  The patient continues to build skills to manage mental health symptoms and pain. affect is brighter.  She initially suggested processing something that was of minimal importance but ultimately agreed to continue with the treatment plan.  She was showing some avoidance by attempting to redirect the therapy.    Type of psychotherapeutic technique provided:  motivational interviewing, EMDR desensitization on the memory of her mother writing a letter in secret to send her away to work in town for her schooling    Response to psychotherapeutic interventions:  Patient responded to interventions in the following manner: Enthusiastic, she identified a feeder memory having to do with not feeling protected as a child    Progress toward short term goals:  Progress as expected: Patient is practicing skills taught in psychotherapy and seeing benefits.    Review of long-term goals:  Not done at today's visit. Plan updated 2/6/17 :  1. Increase sense of self worth from 7/10 to 9/10.   2. Develop a greater acceptance of leadership decisions.  3.  Improve ability to get good sleep from one night out of the week to four nights per week.    Diagnosis:   1. Major depressive disorder, recurrent episode, moderate degree    2. Generalized anxiety disorder      Plan and Follow-Up:  Patient will return for follow-up psychotherapy session in 2 weeks    Patient will complete the following homework before our next session:  Patient plans to continue working with above-mentioned skills. she will use that container exercise to regulate emotions and memories during the next 2 weeks    Discharge Criteria/ Planning:  Patient will continue with follow-up until therapy can be discontinued without return of symptoms.      Candelaria Barr 7/10/2017      This note was created  with help of Dragon dictation software. Grammatical / typing errors are not intentional and inherent to the software.

## 2021-06-12 NOTE — PROGRESS NOTES
MENTAL HEALTH VISIT NOTE    Date of Service: 9/5/2017    Start time:2:02 pm  Stop time:2:58 pm  This is session number 12 this calendar year.  The patient was seen for individual psychotherapy    No  was required because the patient speaks and understands English.  Shyanne Gonzalez is a 81 y.o. female is being seen today for individual psychotherapy to address the following:    Chief Complaint   Patient presents with     Depression     Anxiety   .     NECESSITY: This individual session was necessary for the care of the patient to address difficulties in psychosocial functioning that are affected by and affect the patient's ability to cope with and heal from spinal conditions and are affected by her mental health diagnosis listed in the diagnosis section below.    New symptoms or complaints: None    Functional Impairment (0-4):   Personal: 3  Family: 1  Work: 1  Social:2    Clinical Assessment of Mental Status  Mood: Sad and Anxious    Affect:   Congruent with content of speech    Appearance:  well-groomed, casually dressed, engaged    Speech:  Within normal limits    Orientation:   Oriented to person, time, and place    Memory:  No evidence of impairment.    Concentration:  Within normal limits    Focus:   Within normal limits    Fund of knowledge:  adequate    Suicidal Ideation present:   Absent  Suicidal Risk Assessment:  No specific plan to harm self.  Patient does not endorse any intention to harm self.  Patient is aware of resources available if she experiences suicidal ideation.      Homicidal Risk Assessment:   None reported  No crisis plan required due to absence of risk.    Patient's impression of their current status:  Patient believes symptoms are improving overall.  Patient believes psychotherapy sessions continue to be helpful in maintaining skills and insight acquired over past sessions.  She is quite concerned about some symptoms going on with her eyes and headaches.  She is noticing more  anxiety about this.  The anxiety is not so much about her dying, but it is about what it would mean for her life and for the amount of control that she can have in her life as well as where she might live.    Therapist impression of patient's current status:  Symptoms  are improving overall.  The patient continues to build skills to manage mental health symptoms and pain.  While she is coming to terms with some major changes with her health and functioning, she is becoming more accepting of the fact that she needs to make modifications and that she could still enjoy life even with the changes that are occurring.    Type of psychotherapeutic technique provided:  motivational interviewing, guided discovery    Response to psychotherapeutic interventions:  Patient responded to interventions in the following manner: Enthusiastic    Progress toward short term goals:  Progress as expected: Patient is practicing skills taught in psychotherapy and seeing benefits.    Review of long-term goals:  Not done at today's visit. Tx Plan updated 2/6/17 :  1. Increase sense of self worth from 7/10 to 9/10.   2. Develop a greater acceptance of leadership decisions.  3.  Improve ability to get good sleep from one night out of the week to four nights per week.    Diagnosis:   1. Major depressive disorder, recurrent episode, moderate degree    2. Generalized anxiety disorder      Plan and Follow-Up:  Patient will return for follow-up psychotherapy session in 2 weeks.    Patient will complete the following homework before our next session:  Patient plans to continue working with above-mentioned skills.  She would like to focus on walking more often.    Discharge Criteria/ Planning:  Patient will continue with follow-up until therapy can be discontinued without return of symptoms.      Candelaria Barr 9/5/2017      This note was created with help of Dragon dictation software. Grammatical / typing errors are not intentional and inherent to  the software.

## 2021-06-12 NOTE — PROGRESS NOTES
"MENTAL HEALTH VISIT NOTE    Date of Service: 7/24/2017    Start time:2:05 pm  Stop time:3:00 pm  This is session number 10 this calendar year.  The patient was seen for individual psychotherapy    No  was required because the patient speaks and understands English.  Shyanne Gonzalez is a 81 y.o. female is being seen today for individual psychotherapy to address the following:    Chief Complaint   Patient presents with     Depression     Anxiety   .     NECESSITY: This individual session was necessary for the care of the patient to address difficulties in psychosocial functioning that are affected by and affect the patient's ability to cope with and heal from spinal conditions and are affected by her mental health diagnosis listed in the diagnosis section below.    New symptoms or complaints: None    Functional Impairment (0-4):   Personal: 2  Family: 1  Work: 2  Social:3    Clinical Assessment of Mental Status  Mood: Euthymic    Affect:   Congruent with content of speech    Appearance:  well-groomed, casually dressed, engaged    Speech:  Within normal limits    Orientation:   Oriented to person, time, and place    Memory:  No evidence of impairment.    Concentration:  Within normal limits    Focus:   Within normal limits    Fund of knowledge:  adequate    Suicidal Ideation present:   Absent  Suicidal Risk Assessment:  No specific plan to harm self.  Patient does not endorse any intention to harm self.  Patient is aware of resources available if she experiences suicidal ideation.      Homicidal Risk Assessment:   None reported  No crisis plan required due to absence of risk.    Patient's impression of their current status:  Patient believes symptoms are improving overall.  Patient believes psychotherapy sessions continue to be helpful in maintaining skills and insight acquired over past sessions. she reports feeling even better about the incident that we processed last time.  She states \"I do not feel bad " Pt had a Rapid response for CC of worsening Abdominal Pain ovenight. Pt was found to have SBO on KUB, now s/p NGT to suction. Pt continued to complain of abdominal pain- got CT A/P stat which showed -Large amount of ascites. Stranding of omentum and small bowel mesentery. Mid jejunal small bowel obstruction. Thickening of jejunal small bowel loops  and possibly ascending colon  Spoke with Dr Franklin Montgomery (Gen surgery) personally & discussed the results of CT imaging. Recommends to cont NGT to suction, consider Ascitic tap to R/o SBP. Ordered US paracentesis in AM.  S/p 500 ml + 500 ml L NS bolus for hypotension , ordered IV Albumin to help with Hypotension  Transferred to ICU for closer monitoring. Oxygen to keep sats up. Consider diuresis as able. Pt critically ill & at high risk for further deterioration due to poor reserve. "about it anymore because I see how much good came of it.\"    Therapist impression of patient's current status:  Symptoms  are improving overall.  The patient continues to build skills to manage mental health symptoms and pain. affect is brighter than usual.    Type of psychotherapeutic technique provided:  motivational interviewing, EMDR desensitization, installation, body scan, and closure phase regarding memory related to not having baby pictures.    Response to psychotherapeutic interventions:  Patient responded to interventions in the following manner: Enthusiastic    Progress toward short term goals:  Progress as expected: Patient is practicing skills taught in psychotherapy and seeing benefits.    Review of long-term goals:  Not done at today's visit. Tx Plan updated 2/6/17 :  1. Increase sense of self worth from 7/10 to 9/10.   2. Develop a greater acceptance of leadership decisions.  3.  Improve ability to get good sleep from one night out of the week to four nights per week.    Diagnosis:   1. Major depressive disorder, recurrent episode, moderate degree    2. Generalized anxiety disorder      Plan and Follow-Up:  Patient will return for follow-up psychotherapy session in one week    Patient will complete the following homework before our next session:  Patient plans to continue working with above-mentioned skills.    Discharge Criteria/ Planning:  Patient will continue with follow-up until therapy can be discontinued without return of symptoms.      Candelaria Barr 7/24/2017      This note was created with help of Dragon dictation software. Grammatical / typing errors are not intentional and inherent to the software.  "

## 2021-06-12 NOTE — PROGRESS NOTES
[facility] Treatment Plan - Update          Name: Shyanne Gonzalez  : 1935  MRN: [id number]      Treatment Plan: Initial Treatment Plan  Intake/initial treatment plan date: 2016  Updated: 17  Benefit and risks and alternatives have been discussed: Yes  Is this treatment appropriate with minimal intrusion/restrictions: Yes  Estimated duration of treatment: Approximately 20 sessions.  Anticipated frequency of services: Every 2 weeks  Necessity for frequency: This frequency is needed to establish therapeutic goals and for continuity of care in order to monitor progress.  Necessity for treatment: To address cognitive, behavioral, and/or emotional barriers in order to work toward goals and to improve quality of life.      Exercise    Assertiveness    Plan:   1. Problem:  Problem:  Chronic pain  Goal:  Increase ability to cope well with my chronic health conditions from  some  of the time to most of the time.   Strategies: ? [x] Explore thoughts and expectations about self and others         [x] Explore emotional reactions to illness/injury      ? [x] Learn and practice relaxation techniques and other coping  strategies            [x] Implement physical activity routine (with physician approval)     ? [x] Engage in values clarification and goal-setting     ? [x] Consider introduction of bibliotherapy and/or videos     ? [x] Increase involvement in meaningful activities     ? [x] Discuss sleep hygiene     ? [x] Process grief (loss of significant person, independence, role,          etc.)     ? [x] Motivational interviewing     ? [x] Find ways to build community.      Degree to which this is a problem (1-4): 4  Degree to which goal is met (1-4)1  Date goal was initiated: 2017       2. Problem:   Voiceless-ness   Goal:  Increase % of time I communicate assertively with others from  40% to 80%.  Strategies: ?[x] Learn assertive communication skills through role-play and  other  techniques     ?[x]  Explore thoughts and expectations about self and others      ?[x]  Learn and practice relaxation techniques and other coping strategies     [x] Consider inviting others to attend conjoint sessions     ?[x] Consider introduction of bibliotherapy and/or videos     ?[x]  EMDR      Degree to which this is a problem (1-4): 3  Degree to which goal is met (1-4)2  Date goal was initiated: 8/7/2017                          ? 3. Depression/Sleep                                   Goal:              Improve ability to get good sleep from one night out of the week to four nights per week.                        Strategies:                 ?[x] Decrease social isolation                                                                    [x] Increase involvement in meaningful activities                                                                    ?[x] Discuss sleep hygiene                                                                    ?[x] Explore thoughts and expectations about self and others                                                                    ?[x] Process grief (loss of significant person, independence, role,                                                                   etc.)                                                                    ?[x] Change sleeping habits                                                                    [x] Consider introduction of bibliotherapy and/or videos                                                                    [x] Continue compliance with medical treatment plan (or explore                                                                    barriers)                                                                       ?    Degree to which this is a problem (1-4): 4  Degree to which goal is met (1-4)0  Date goal was initiated: 2/6/2017  Date of Review: 8/7/2017  Treatment Goal Status:continued as written          ________________________________________  Discontinued Goals  Depression   Goal: Increase sense of self worth from 7/10 to 9/10.   Strategies: ?[x] Decrease social isolation  [x] Increase involvement in meaningful activities  ?[] Discuss sleep hygiene  ?[x] Explore thoughts and expectations about self and others  ?[x] Process grief (loss of significant person, independence, role, etc.)  ?[] Assess for suicide risk  ?[x] Implement physical activity routine (with physician approval)  [x] Consider introduction of bibliotherapy and/or videos  [x] Continue compliance with medical treatment plan (or explore Barriers)  [x] Use narrative techniques and writing.      ?   Degree to which this is a problem (1-4): 1  Degree to which goal is met (1-4)4  Date goal was initiated: 5/16/2016  Date of Review: 8/22/2016  Treatment Goal Status:continued, but revised as follows:changed baseline level to 7/10    Date of Review: 2/6/2017  Treatment Goal Status:continued as written       Date of Review: 8/7/2017  Treatment Goal Status:discontinued as this goal has been met to the satisfaction of the patient     Anxiety   Goal: Develop a greater acceptance of leadership decisions.  Strategies: ? [x]Learn and practice relaxation techniques and other coping strategies (e.g., thought stopping, reframing, meditation)  ? [x] Increase involvement in meaningful activities  ? [] Discuss sleep hygiene  ? [x] Explore thoughts and expectations about self and others  ? [x] Identify and monitor triggers for panic/anxiety symptoms  ? [x] Implement physical activity routine (with physician approval)  ? [x] Consider introduction of bibliotherapy and/or videos  ? [x] Continue compliance with medical treatment plan (or explore barriers)   [x] Clinical hypnosis      Degree to which this is a problem (1-4):2  Degree to which goal is met (1-4):4  Date goal was initiated: 5/16/2016  Date of Review: 8/22/2016  Treatment Goal Status:continued as written     Date of Review: 2/6/2017  Treatment Goal Status:continued, but revised as follows:focus on acceptance    Date of Review: 8/7/2017  Treatment Goal Status:discontinued as this goal has been met to the satisfaction of the patient       Functional Impairment: 1=Not at all/Rarely 2=Some days 3=Most Days 4=Every Day      Personal: 2  Family: 1  Social: 3  Work: 1      Diagnosis:  Major Depressive Disorder, Moderate, Recurrent  Generalized Anxiety Disorder      Strengths: ability for insight, average or above intelligence, capable of independent living, communication skills, general fund of knowledge, motivation for treatment/growth, patient is voluntary, is willing to work on problems, [employer] affiliation, special hobby/interest, work skills   Limitations: The patient has the following limitations: , insufficient support network and chronic pain   Cultural Considerations: [employer]: spiritual concerns / distress, general cultural considerations: member of [employer] order; racial/ethnic background:           Persons responsible for this plan:  ? [x] Patient ? [x] Provider ? [] Other: __________________          Provider: Performed and documented by CARL Mccabe 8/7/17                      Patient Signature:____________________________________ Date: ______________             Therapist Signature: __________________________________ Date: ______________

## 2021-06-12 NOTE — PROGRESS NOTES
MENTAL HEALTH VISIT NOTE    Date of Service: 8/7/2017    Start time: 2:07 PM  Stop time: 3:00 PM  This is session number 11 this calendar year.  The patient was seen for individual psychotherapy    No  was required because the patient speaks and understands English.  Shyanne Gonzalez is a 81 y.o. female is being seen today for individual psychotherapy to address the following:    Chief Complaint   Patient presents with     Depression     Anxiety   .     NECESSITY: This individual session was necessary for the care of the patient to address difficulties in psychosocial functioning that are affected by and affect the patient's ability to cope with and heal from spinal conditions and are affected by her mental health diagnosis listed in the diagnosis section below.    New symptoms or complaints: None    Functional Impairment (0-4):   Personal: 3  Family: 1  Work: 1  Social:2    Clinical Assessment of Mental Status  Mood: Euthymic and Anxious    Affect:   Congruent with content of speech    Appearance:  well-groomed, casually dressed, engaged    Speech:  Within normal limits    Orientation:   Oriented to person, time, and place    Memory:  No evidence of impairment.    Concentration:  Within normal limits    Focus:   Within normal limits    Fund of knowledge:  adequate    Suicidal Ideation present:   Absent  Suicidal Risk Assessment:  No specific plan to harm self.  Patient does not endorse any intention to harm self.  Patient is aware of resources available if she experiences suicidal ideation.      Homicidal Risk Assessment:   None reported  No crisis plan required due to absence of risk.    Patient's impression of their current status:  Patient believes symptoms are improving overall.  Patient believes psychotherapy sessions continue to be helpful in maintaining skills and insight acquired over past sessions.  She states that it is easier for her to let go of things that happened in the past these days.   This is helping with her mood a great deal.    Therapist impression of patient's current status:  Symptoms  are improving overall.  The patient continues to build skills to manage mental health symptoms and pain.  She is feeling a lot better, however she would like to work more on developing motivation and skills to deal with her chronic illness.    Type of psychotherapeutic technique provided:  motivational interviewing, review of progress, goal setting    Response to psychotherapeutic interventions:  Patient responded to interventions in the following manner: Enthusiastic    Progress toward short term goals:  Progress as expected: Patient is practicing skills taught in psychotherapy and seeing benefits.    Review of long-term goals:  Treatment plan updated to read as follows:                  1. Increase ability to cope well with my chronic health conditions from  some  of the time to most of the time.  2. Increase % of time I communicate assertively with others from  40% to 80%.  3. Improve ability to get good sleep from one night out of the week to four nights per week.  Diagnosis:   1. Major depressive disorder, recurrent episode, moderate degree    2. Generalized anxiety disorder      Plan and Follow-Up:  Patient will return for follow-up psychotherapy session in 2 weeks.  Patient will complete the following homework before our next session:  Patient plans to continue working with above-mentioned skills.  She will focus on some of the behavioral changes that she needs to make.    Discharge Criteria/ Planning:  Patient will continue with follow-up until therapy can be discontinued without return of symptoms.      Candelaria Barr 8/7/2017      This note was created with help of Dragon dictation software. Grammatical / typing errors are not intentional and inherent to the software.

## 2021-06-13 NOTE — PROGRESS NOTES
MENTAL HEALTH VISIT NOTE    Date of Service: 10/16/2017    Start time:2:08 pm  Stop time:2:55 pm  This is session number 16 this calendar year.  The patient was seen for individual psychotherapy    No  was required because the patient speaks and understands English.  Shyanne Gonzalez is a 82 y.o. female is being seen today for individual psychotherapy to address the following:    Chief Complaint   Patient presents with     Depression     Anxiety   .     NECESSITY: This individual session was necessary for the care of the patient to address difficulties in psychosocial functioning that are affected by and affect the patient's ability to cope with and heal from spinal conditions and are affected by her mental health diagnosis listed in the diagnosis section below.    New symptoms or complaints: None    Functional Impairment (0-4):   Personal: 2  Family: 1  Work: 2  Social:3    Clinical Assessment of Mental Status  Mood: Euthymic and Anxious    Affect:   Congruent with content of speech    Appearance:  well-groomed, casually dressed, engaged    Speech:  Within normal limits    Orientation:   Oriented to person, time, and place    Memory:  No evidence of impairment.    Concentration:  Within normal limits    Focus:   Within normal limits    Fund of knowledge:  adequate    Suicidal Ideation present:   Absent  Suicidal Risk Assessment:  No specific plan to harm self.  Patient does not endorse any intention to harm self.  Patient is aware of resources available if she experiences suicidal ideation.      Homicidal Risk Assessment:   None reported  No crisis plan required due to absence of risk.    Patient's impression of their current status:  Patient believes symptoms are improving overall.  Patient believes psychotherapy sessions continue to be helpful in maintaining skills and insight acquired over past sessions.  She notes that her pain has been quite a lot better.  She continues to struggle with  perseveration on how she has been wronged by people around her.    Therapist impression of patient's current status:  Symptoms  are improving overall.  The patient continues to build skills to manage mental health symptoms and pain.  She is improving her ability to identify that the driving beliefs behind intense emotions.    Type of psychotherapeutic technique provided:  CBT, motivational interviewing, identification of automatic thoughts and feelings, guided discovery    Response to psychotherapeutic interventions:  Patient responded to interventions in the following manner: Enthusiastic    Progress toward short term goals:  Progress as expected: Patient is practicing skills taught in psychotherapy and seeing benefits.    Review of long-term goals:  Not done at today's visit. Tx Plan updated 8/7/17 :  1. Increase sense of self worth from 7/10 to 9/10.   2.              Increase % of time I communicate assertively with others from  40% to 80%.  3.  Improve ability to get good sleep from one night out of the week to four nights per week.    Diagnosis:   1. Major depressive disorder, recurrent episode, moderate degree    2. Generalized anxiety disorder      Plan and Follow-Up:  Patient will return for follow-up psychotherapy session in 2 weeks.    Patient will complete the following homework before our next session:  Patient plans to continue working with above-mentioned skills.  She will continue to work on identifying what is in her control and what is not and letting go of things that are not her responsibility.    Discharge Criteria/ Planning:  Patient will continue with follow-up until therapy can be discontinued without return of symptoms.      Candelaria Barr 10/16/2017      This note was created with help of Dragon dictation software. Grammatical / typing errors are not intentional and inherent to the software.

## 2021-06-13 NOTE — PROGRESS NOTES
MENTAL HEALTH VISIT NOTE    Date of Service: 10/31/2017    Start time:1:05 pm  Stop time:1:58 pm  This is session number 17 this calendar year.  The patient was seen for individual psychotherapy    No  was required because the patient speaks and understands English.  Shyanne Gonzalez is a 82 y.o. female is being seen today for individual psychotherapy to address the following:    Chief Complaint   Patient presents with     Depression     Anxiety   .     NECESSITY: This individual session was necessary for the care of the patient to address difficulties in psychosocial functioning that are affected by and affect the patient's ability to cope with and heal from spinal conditions and are affected by her mental health diagnosis listed in the diagnosis section below.    New symptoms or complaints: None    Functional Impairment (0-4):   Personal: 3  Family: 1  Work: 3  Social:3    Clinical Assessment of Mental Status  Mood: Sad    Affect:   Congruent with content of speech    Appearance:  well-groomed, casually dressed, engaged    Speech:  Within normal limits    Orientation:   Oriented to person, time, and place    Memory:  No evidence of impairment.    Concentration:  Within normal limits    Focus:   Within normal limits    Fund of knowledge:  adequate    Suicidal Ideation present:   Absent  Suicidal Risk Assessment:  No specific plan to harm self.  Patient does not endorse any intention to harm self.  Patient is aware of resources available if she experiences suicidal ideation.      Homicidal Risk Assessment:   None reported  No crisis plan required due to absence of risk.    Patient's impression of their current status:  Patient believes symptoms are staying the same overall.  Patient believes psychotherapy sessions continue to be helpful in maintaining skills and insight acquired over past sessions.  She notes that some of the sadness from her brother's recent death is diminishing.  She still finds  herself affected by it however and is noticing that she is quite lonely.    Therapist impression of patient's current status:  Symptoms  are improving overall.  The patient continues to build skills to manage mental health symptoms and pain.  Loneliness and lack of community continue to be an issue.  She is doing much better with letting go of things that are out of her control.    Type of psychotherapeutic technique provided:  CBT, motivational interviewing, identification of automatic thoughts and feelings, guided discovery, discussed sleep hygiene and specifically bimodal sleep, discussed future focus of treatment being on resolving guilt and reducing cravings for sugar    Response to psychotherapeutic interventions:  Patient responded to interventions in the following manner: Enthusiastic    Progress toward short term goals:  Progress as expected: Patient is practicing skills taught in psychotherapy and seeing benefits.    Review of long-term goals:  Not done at today's visit. Tx Plan updated 8/7/17 :  1. Increase sense of self worth from 7/10 to 9/10.   2.              Increase % of time I communicate assertively with others from  40% to 80%.  3.  Improve ability to get good sleep from one night out of the week to four nights per week.    Diagnosis:   1. Major depressive disorder, recurrent episode, moderate degree    2. Generalized anxiety disorder      Plan and Follow-Up:  Patient will return for follow-up psychotherapy session in 2 weeks.    Patient will complete the following homework before our next session:  Patient plans to continue working with above-mentioned skills.    Discharge Criteria/ Planning:  Patient will continue with follow-up until therapy can be discontinued without return of symptoms.  We will do the EMDR craving protocol for sugar next time.    Candelaria Barr 10/31/2017      This note was created with help of Dragon dictation software. Grammatical / typing errors are not intentional and  inherent to the software.

## 2021-06-13 NOTE — PROGRESS NOTES
MENTAL HEALTH VISIT NOTE    Date of Service: 9/19/2017    Start time:2:00 pm  Stop time:3:00 pm  This is session number 13 this calendar year.  The patient was seen for individual psychotherapy    No  was required because the patient speaks and understands English.  Shyanne Gonzalez is a 82 y.o. female is being seen today for individual psychotherapy to address the following:    Chief Complaint   Patient presents with     Depression     Anxiety   .     NECESSITY: This individual session was necessary for the care of the patient to address difficulties in psychosocial functioning that are affected by and affect the patient's ability to cope with and heal from spinal conditions and are affected by her mental health diagnosis listed in the diagnosis section below.    New symptoms or complaints: None    Functional Impairment (0-4):   Personal: 2  Family: 1  Work: 2  Social:3    Clinical Assessment of Mental Status  Mood: Euthymic    Affect:   Congruent with content of speech    Appearance:  well-groomed, casually dressed, engaged    Speech:  Within normal limits    Orientation:   Oriented to person, time, and place    Memory:  No evidence of impairment.    Concentration:  Within normal limits    Focus:   Within normal limits    Fund of knowledge:  adequate    Suicidal Ideation present:   Absent  Suicidal Risk Assessment:  No specific plan to harm self.  Patient does not endorse any intention to harm self.  Patient is aware of resources available if she experiences suicidal ideation.      Homicidal Risk Assessment:   None reported  No crisis plan required due to absence of risk.    Patient's impression of their current status:  Patient believes symptoms are improving overall.  Patient believes psychotherapy sessions continue to be helpful in maintaining skills and insight acquired over past sessions.  She is working on focusing on one day at a time versus getting too far ahead of herself and worrying about  the future and what the changes in her [employer] and living situation will bring.    Therapist impression of patient's current status:  Symptoms  are improving overall.  The patient continues to build skills to manage mental health symptoms and pain.  She is noticing more pain, but she is less distressed about this pain.    Type of psychotherapeutic technique provided:  motivational interviewing, identification of coping strategies she is currently using    Response to psychotherapeutic interventions:  Patient responded to interventions in the following manner: Enthusiastic    Progress toward short term goals:  Progress as expected: Patient is practicing skills taught in psychotherapy and seeing benefits.    Review of long-term goals:  Not done at today's visit. Tx Plan updated 8/7/17 :  1. Increase sense of self worth from 7/10 to 9/10.   2.              Increase % of time I communicate assertively with others from  40% to 80%.  3.  Improve ability to get good sleep from one night out of the week to four nights per week.  Diagnosis:   1. Major depressive disorder, recurrent episode, moderate degree    2. Generalized anxiety disorder      Plan and Follow-Up:  Patient will return for follow-up psychotherapy session in 2 weeks.    Patient will complete the following homework before our next session:  Patient plans to continue working with above-mentioned skills.  She will continue to work on being involved in the various meditation and prayer circles that she is in order to meet her needs for having a confidant.    Discharge Criteria/ Planning:  Patient will continue with follow-up until therapy can be discontinued without return of symptoms.      Candelaria Barr 9/19/2017      This note was created with help of Dragon dictation software. Grammatical / typing errors are not intentional and inherent to the software.

## 2021-06-13 NOTE — PROGRESS NOTES
MENTAL HEALTH VISIT NOTE    Date of Service: 10/2/2017    Start time:2:05 pm  Stop time:3:00 pm  This is session number 14 this calendar year.  The patient was seen for individual psychotherapy    No  was required because the patient speaks and understands English.  Shyanne Gonzalez is a 82 y.o. female is being seen today for individual psychotherapy to address the following:    Chief Complaint   Patient presents with     Depression     Anxiety   .     NECESSITY: This individual session was necessary for the care of the patient to address difficulties in psychosocial functioning that are affected by and affect the patient's ability to cope with and heal from spinal conditions and are affected by her mental health diagnosis listed in the diagnosis section below.    New symptoms or complaints: None    Functional Impairment (0-4):   Personal: 3  Family: 2  Work: 2  Social:3    Clinical Assessment of Mental Status  Mood: Euthymic    Affect:   Congruent with content of speech    Appearance:  well-groomed, casually dressed, engaged    Speech:  Within normal limits    Orientation:   Oriented to person, time, and place    Memory:  No evidence of impairment.    Concentration:  Within normal limits    Focus:   Within normal limits    Fund of knowledge:  adequate    Suicidal Ideation present:   Absent  Suicidal Risk Assessment:  No specific plan to harm self.  Patient does not endorse any intention to harm self.  Patient is aware of resources available if she experiences suicidal ideation.      Homicidal Risk Assessment:   None reported  No crisis plan required due to absence of risk.    Patient's impression of their current status:  Patient believes symptoms are improving overall.  Patient believes psychotherapy sessions continue to be helpful in maintaining skills and insight acquired over past sessions.  She continues to be able to let go of things that are outside of her control more easily.    Therapist  impression of patient's current status:  Symptoms  are improving overall.  The patient continues to build skills to manage mental health symptoms and pain.  She is doing much better in terms of dealing with anger and negative emotions, however she continues to be quite hard on herself when she cannot do things that she wants to do.    Type of psychotherapeutic technique provided:  motivational interviewing, CBT, guided discovery, review of progress    Response to psychotherapeutic interventions:  Patient responded to interventions in the following manner: Enthusiastic    Progress toward short term goals:  Progress as expected: Patient is practicing skills taught in psychotherapy and seeing benefits.    Review of long-term goals:  Not done at today's visit. Tx Plan updated 8/7/17 :  1. Increase sense of self worth from 7/10 to 9/10.   2.              Increase % of time I communicate assertively with others from  40% to 80%.  3.  Improve ability to get good sleep from one night out of the week to four nights per week.    Diagnosis:   1. Major depressive disorder, recurrent episode, moderate degree    2. Generalized anxiety disorder      Plan and Follow-Up:  Patient will return for follow-up psychotherapy session in one week    Patient will complete the following homework before our next session:  Patient plans to continue working with above-mentioned skills.    Discharge Criteria/ Planning:  Patient will continue with follow-up until therapy can be discontinued without return of symptoms.  We will plan to do EMDR focused on pain management next time.  Candelaria Barr 10/2/2017      This note was created with help of Dragon dictation software. Grammatical / typing errors are not intentional and inherent to the software.

## 2021-06-14 NOTE — PROGRESS NOTES
MENTAL HEALTH VISIT NOTE    Date of Service: 11/10/2017    Start time:2:05 pm  Stop time:3:00 pm  This is session number 18 this calendar year.  The patient was seen for individual psychotherapy    No  was required because the patient speaks and understands English.  Shyanne Gonzalez is a 82 y.o. female is being seen today for individual psychotherapy to address the following:    Chief Complaint   Patient presents with     Depression     Anxiety   .     NECESSITY: This individual session was necessary for the care of the patient to address difficulties in psychosocial functioning that are affected by and affect the patient's ability to cope with and heal from spinal conditions and are affected by her mental health diagnosis listed in the diagnosis section below.    New symptoms or complaints: None    Functional Impairment (0-4):   Personal:2  Family: 0  Work: 2  Social:2    Clinical Assessment of Mental Status  Mood: Euthymic , anxious  Affect:   Congruent with content of speech    Appearance:  well-groomed, casually dressed, engaged    Speech:  Within normal limits    Orientation:   Oriented to person, time, and place    Memory:  No evidence of impairment.    Concentration:  Within normal limits    Focus:   Within normal limits    Fund of knowledge:  adequate    Suicidal Ideation present:   Absent  Suicidal Risk Assessment:  No specific plan to harm self.  Patient does not endorse any intention to harm self.  Patient is aware of resources available if she experiences suicidal ideation.      Homicidal Risk Assessment:   None reported  No crisis plan required due to absence of risk.    Patient's impression of their current status:  Patient believes symptoms are improving overall.  Patient believes psychotherapy sessions continue to be helpful in maintaining skills and insight acquired over past sessions.  She is enjoying generally feeling stable and being able to let go of things that would have bothered  her in the past.    Therapist impression of patient's current status:  Symptoms  are improving overall.  The patient continues to build skills to manage mental health symptoms and pain.  Her focus is more on her physical symptoms these days.  She is feeling better in terms of her mood and anxiety.    Type of psychotherapeutic technique provided:  CBT, motivational interviewing, clinical hypnosis focused on relaxation and restful restorative sleep    Response to psychotherapeutic interventions:  Patient responded to interventions in the following manner: Enthusiastic    Progress toward short term goals:  Progress as expected: Patient is practicing skills taught in psychotherapy and seeing benefits.    Review of long-term goals:  Not done at today's visit. Tx Plan updated 8/7/17 :  1. Increase sense of self worth from 7/10 to 9/10.   2.              Increase % of time I communicate assertively with others from  40% to 80%.  3.  Improve ability to get good sleep from one night out of the week to four nights per week.    Diagnosis:   1. Major depressive disorder, recurrent episode, moderate degree    2. Generalized anxiety disorder      Plan and Follow-Up:  Patient will return for follow-up psychotherapy session in two weeks.    Patient will complete the following homework before our next session:  Patient plans to continue working with above-mentioned skills.    Discharge Criteria/ Planning:  Patient will continue with follow-up until therapy can be discontinued without return of symptoms.  Continue using hypnosis, particularly for pain and for physical symptoms.    Candelaria Barr 11/10/2017      This note was created with help of Dragon dictation software. Grammatical / typing errors are not intentional and inherent to the software.

## 2021-06-14 NOTE — PROGRESS NOTES
MENTAL HEALTH VISIT NOTE    Date of Service: 11/27/2017    Start time:2:00 pm  Stop time:3:00 pm  This is session number 19 this calendar year.  The patient was seen for individual psychotherapy    No  was required because the patient speaks and understands English.  Shyanne Gonzalez is a 82 y.o. female is being seen today for individual psychotherapy to address the following:    Chief Complaint   Patient presents with     Depression     Anxiety   .     NECESSITY: This individual session was necessary for the care of the patient to address difficulties in psychosocial functioning that are affected by and affect the patient's ability to cope with and heal from spinal conditions and are affected by her mental health diagnosis listed in the diagnosis section below.    New symptoms or complaints: None    Functional Impairment (0-4):   Personal: 3  Family: 0  Work: 2  Social:2    Clinical Assessment of Mental Status  Mood: Euthymic    Affect:   Congruent with content of speech    Appearance:  well-groomed, casually dressed, engaged    Speech:  Within normal limits    Orientation:   Oriented to person, time, and place    Memory:  No evidence of impairment.    Concentration:  Within normal limits    Focus:   Within normal limits    Fund of knowledge:  adequate    Suicidal Ideation present:   Absent  Suicidal Risk Assessment:  No specific plan to harm self.  Patient does not endorse any intention to harm self.  Patient is aware of resources available if she experiences suicidal ideation.      Homicidal Risk Assessment:   None reported  No crisis plan required due to absence of risk.    Patient's impression of their current status:  Patient believes symptoms are improving overall.  Patient believes psychotherapy sessions continue to be helpful in maintaining skills and insight acquired over past sessions.  Her sleep is better than it has been in the past.    Therapist impression of patient's current  status:  Symptoms  are improving overall.  The patient continues to build skills to manage mental health symptoms and pain.  She is doing much better with letting go of anger and resentment.  She continues to have a great deal of anxiety about health symptoms and about death.  She shares in this session that she is feeling quite depleted by the demands that others are making on her time and emotional energy.    Type of psychotherapeutic technique provided:  CBT, motivational interviewing, identification of automatic thoughts and feelings, guided discovery, clinical hypnosis focused on treating metaphor    Response to psychotherapeutic interventions:  Patient responded to interventions in the following manner: Enthusiastic    Progress toward short term goals:  Progress as expected: Patient is practicing skills taught in psychotherapy and seeing benefits.    Review of long-term goals:  Not done at today's visit. Tx Plan updated 8/7/17 :  1. Increase sense of self worth from 7/10 to 9/10.   2.              Increase % of time I communicate assertively with others from  40% to 80%.  3.  Improve ability to get good sleep from one night out of the week to four nights per week.    Diagnosis:   1. Major depressive disorder, recurrent episode, moderate degree    2. Generalized anxiety disorder      Plan and Follow-Up:  Patient will return for follow-up psychotherapy session in 2 weeks.  Patient will complete the following homework before our next session:  Patient plans to continue working with above-mentioned skills.    Discharge Criteria/ Planning:  Patient will continue with follow-up until therapy can be discontinued without return of symptoms.  She would like to engage in the tree exercise again at a future session.    Candelaria Barr 11/27/2017      This note was created with help of Dragon dictation software. Grammatical / typing errors are not intentional and inherent to the software.

## 2021-06-15 NOTE — PROGRESS NOTES
MENTAL HEALTH VISIT NOTE    Date of Service: 1/3/2018    Start time:2:03 pm  Stop time:3:00 pm  This is session number 1 this calendar year.  The patient was seen for individual psychotherapy    No  was required because the patient speaks and understands English.  Shyanne Gonzalez is a 82 y.o. female is being seen today for individual psychotherapy to address the following:    Chief Complaint   Patient presents with     Depression     Anxiety   .     NECESSITY: This individual session was necessary for the care of the patient to address difficulties in psychosocial functioning that are affected by and affect the patient's ability to cope with and heal from spinal conditions and are affected by her mental health diagnosis listed in the diagnosis section below.    New symptoms or complaints: None    Functional Impairment (0-4):   Personal: 3  Family: 2  Work: 2  Social:3    Clinical Assessment of Mental Status  Mood: Depressed and Anxious    Affect:   Congruent with content of speech and Tearful    Appearance:  well-groomed, casually dressed, engaged    Speech:  Within normal limits    Orientation:   Oriented to person, time, and place    Memory:  No evidence of impairment.    Concentration:  Within normal limits    Focus:   Within normal limits    Fund of knowledge:  adequate    Suicidal Ideation present:   Absent  Suicidal Risk Assessment:  No specific plan to harm self.  Patient does not endorse any intention to harm self.  Patient is aware of resources available if she experiences suicidal ideation.      Homicidal Risk Assessment:   None reported  No crisis plan required due to absence of risk.    Patient's impression of their current status:  Patient believes symptoms are worsening overall.  Patient believes psychotherapy sessions continue to be helpful in maintaining skills and insight acquired over past sessions.  She has noticed an increase in sadness and irritability over the holidays.  She  reports that this season was particularly difficult since her brother passed recently.  She is finding herself more and more worried about her fatigue.    Therapist impression of patient's current status:  Symptoms  are staying the same overall.  The patient continues to build skills to manage mental health symptoms and pain.  She continues to remain engaged in social activities and meaningful work.  She exhibits slightly more perseveration on health issues than in the past.    Type of psychotherapeutic technique provided:  CBT, motivational interviewing, identification of automatic thoughts and feelings, guided discovery    Response to psychotherapeutic interventions:  Patient responded to interventions in the following manner: Enthusiastic    Progress toward short term goals:  Progress as expected: Patient is practicing skills taught in psychotherapy and seeing benefits.    Review of long-term goals:  Not done at today's visit. Tx Plan updated 8/7/17 :  1. Increase sense of self worth from 7/10 to 9/10.   2.              Increase % of time I communicate assertively with others from  40% to 80%.  3.  Improve ability to get good sleep from one night out of the week to four nights per week.    Diagnosis:   1. Major depressive disorder, recurrent episode, moderate degree    2. Generalized anxiety disorder      Plan and Follow-Up:  Patient will return for follow-up psychotherapy session in 2 weeks.    Patient will complete the following homework before our next session:  Patient plans to continue working with above-mentioned skills.  Continue to work on acceptance of limitations.    Discharge Criteria/ Planning:  Patient will continue with follow-up until therapy can be discontinued without return of symptoms.      Candelaria Barr 1/3/2018      This note was created with help of Dragon dictation software. Grammatical / typing errors are not intentional and inherent to the software.

## 2021-06-15 NOTE — PROGRESS NOTES
MENTAL HEALTH VISIT NOTE    Date of Service: 1/31/2018    Start time:2:04 pm  Stop time:3:04 pm  This is session number 2 this calendar year.  The patient was seen for individual psychotherapy    No  was required because the patient speaks and understands English.  Shyanne Gonzalez is a 82 y.o. female is being seen today for individual psychotherapy to address the following:    Chief Complaint   Patient presents with     Depression     Anxiety   .     NECESSITY: This individual session was necessary for the care of the patient to address difficulties in psychosocial functioning that are affected by and affect the patient's ability to cope with and heal from spinal conditions and are affected by her mental health diagnosis listed in the diagnosis section below.    New symptoms or complaints: None    Functional Impairment (0-4):   Personal: 3  Family: 1  Work: 2  Social:2    Clinical Assessment of Mental Status  Mood: Euthymic    Affect:   Congruent with content of speech    Appearance:  well-groomed, casually dressed, engaged    Speech:  Within normal limits    Orientation:   Oriented to person, time, and place    Memory:  No evidence of impairment.    Concentration:  Within normal limits    Focus:   Within normal limits    Fund of knowledge:  adequate    Suicidal Ideation present:   Absent  Suicidal Risk Assessment:  No specific plan to harm self.  Patient does not endorse any intention to harm self.  Patient is aware of resources available if she experiences suicidal ideation.      Homicidal Risk Assessment:   None reported  No crisis plan required due to absence of risk.    Patient's impression of their current status:  Patient believes symptoms are improving overall.  Patient believes psychotherapy sessions continue to be helpful in maintaining skills and insight acquired over past sessions.  She has been pleased to run into a lot of people who appreciated her and her work.  She continues to wish that  she had more recognition for this and this is a source of distress for her.    Therapist impression of patient's current status:  Symptoms  are improving overall.  The patient continues to build skills to manage mental health symptoms and pain.  She has been doing well with not thinking too much about the future events that she cannot control.  She had a recent struggle with this but is back on the right track.    Type of psychotherapeutic technique provided:  CBT, motivational interviewing, identification of automatic thoughts and feelings, guided discovery, discussion of internal versus external validation    Response to psychotherapeutic interventions:  Patient responded to interventions in the following manner: Enthusiastic    Progress toward short term goals:  Progress as expected: Patient is practicing skills taught in psychotherapy and seeing benefits.    Review of long-term goals:  Not done at today's visit. Tx Plan updated 8/7/17 :  1. Increase sense of self worth from 7/10 to 9/10.   2.              Increase % of time I communicate assertively with others from  40% to 80%.  3.  Improve ability to get good sleep from one night out of the week to four nights per week.    Diagnosis:   1. Major depressive disorder, recurrent episode, moderate degree    2. Generalized anxiety disorder      Plan and Follow-Up:  Patient will return for follow-up psychotherapy session in one month    Patient will complete the following homework before our next session:  Patient plans to continue working with above-mentioned skills.  Continue to consider how she can increase her ability to validate herself.  Discharge Criteria/ Planning:  Patient will continue with follow-up until therapy can be discontinued without return of symptoms.      Candelaria Barr 1/31/2018

## 2021-06-16 PROBLEM — G47.33 OBSTRUCTIVE SLEEP APNEA HYPOPNEA, MILD: Status: ACTIVE | Noted: 2019-10-17

## 2021-06-16 PROBLEM — F41.1 ANXIETY STATE: Status: ACTIVE | Noted: 2019-10-17

## 2021-06-16 PROBLEM — K59.01 SLOW TRANSIT CONSTIPATION: Status: ACTIVE | Noted: 2019-10-17

## 2021-06-16 PROBLEM — F41.8 DEPRESSION WITH ANXIETY: Status: ACTIVE | Noted: 2019-10-17

## 2021-06-16 NOTE — PROGRESS NOTES
MENTAL HEALTH VISIT NOTE    Date of Service: 3/12/2018    Start time:2:10 pm  Stop time:3:00 pm  This is session number 3 this calendar year.  The patient was seen for individual psychotherapy    No  was required because the patient speaks and understands English.  Shyanne Gonzalez is a 82 y.o. female is being seen today for individual psychotherapy to address the following:    Chief Complaint   Patient presents with     Depression     Anxiety   .     NECESSITY: This individual session was necessary for the care of the patient to address difficulties in psychosocial functioning that are affected by and affect the patient's ability to cope with and heal from spinal conditions and are affected by her mental health diagnosis listed in the diagnosis section below.    New symptoms or complaints: None    Functional Impairment (0-4):   Personal: 3  Family: 1  Work: 3  Social:3    Clinical Assessment of Mental Status  Mood: Irritable    Affect:   Congruent with content of speech    Appearance:  well-groomed, casually dressed, engaged    Speech:  Within normal limits    Orientation:   Oriented to person, time, and place    Memory:  No evidence of impairment.    Concentration:  Within normal limits    Focus:   Within normal limits    Fund of knowledge:  adequate    Suicidal Ideation present:   Absent  Suicidal Risk Assessment:  No specific plan to harm self.  Patient does not endorse any intention to harm self.  Patient is aware of resources available if she experiences suicidal ideation.      Homicidal Risk Assessment:   None reported  No crisis plan required due to absence of risk.    Patient's impression of their current status:  Patient believes symptoms are improving overall.  She is becoming somewhat more accepting of the challenges endemic to this stage of her life.    Therapist impression of patient's current status:  Symptoms  are improving overall.   She is feeling more able to use her voice, but  continues to experience extreme frustration when she is not heard.    Type of psychotherapeutic technique provided:  CBT, motivational interviewing, identification of automatic thoughts and feelings, guided discovery    Response to psychotherapeutic interventions:  Patient responded to interventions in the following manner: Enthusiastic    Progress toward short term goals:  Progress as expected: Patient is practicing skills taught in psychotherapy and seeing benefits.    Review of long-term goals:  Not done at today's visit. Tx Plan updated 8/7/17 :  1. Increase sense of self worth from 7/10 to 9/10.   2.              Increase % of time I communicate assertively with others from  40% to 80%.  3.  Improve ability to get good sleep from one night out of the week to four nights per week.    Diagnosis:   1. Major depressive disorder, recurrent episode, moderate degree    2. Generalized anxiety disorder      Plan and Follow-Up:  Patient will return for follow-up psychotherapy session in one week    Patient will complete the following homework before our next session:  Patient plans to continue working with above-mentioned skills.    Discharge Criteria/ Planning:  Patient will continue with follow-up until therapy can be discontinued without return of symptoms.   We will review treatment goals and plan on making recordings and using self-hypnosis in the future for sleep problems.      Candelaria Barr 3/12/2018     Opioid Counseling: I discussed with the patient the potential side effects of opioids including but not limited to addiction, altered mental status, and depression. I stressed avoiding alcohol, benzodiazepines, muscle relaxants and sleep aids unless specifically okayed by a physician. The patient verbalized understanding of the proper use and possible adverse effects of opioids. All of the patient's questions and concerns were addressed. They were instructed to flush the remaining pills down the toilet if they did not need them for pain.

## 2021-06-17 NOTE — PATIENT INSTRUCTIONS - HE
Patient Instructions by Patrick Bravo PA-C at 10/17/2019  2:30 PM     Author: Patrick Bravo PA-C Service: -- Author Type: Physician Assistant    Filed: 10/17/2019  3:21 PM Encounter Date: 10/17/2019 Status: Signed    : Patrick Bravo PA-C (Physician Assistant)         Patient Education     Shingles  Shingles is a viral infection caused by the same virus as chicken pox. Anyone who has had chicken pox may get shingles later in life. The virus stays in the body, but remains dormant (asleep). Shingles often occurs in older persons or persons with lowered immunity. But it can affect anyone at any age.  Shingles starts as a tingling patch of skin on one side of the body. Small, painful blisters may then appear. The rash does not spread to the rest of the body.  Exposure to shingles cannot cause shingles. However, it can cause chicken pox in anyone who has not had chicken pox or has not been vaccinated. The contagious period ends when all blisters have crusted over (generally about 2 weeks after the illness begins).  After the blisters heal, the affected skin may be sensitive or painful for months (neuralgia). This often gradually goes away.  A shingles vaccine is available. This can help prevent shingles or make it less painful. It is generally recommended for adults over the age of 60 who have had chicken pox in the past, but who have never had shingles. Adults over 60 who have had neither chicken pox nor shingles can prevent both diseases with the chicken pox vaccine. Ask your healthcare provider about these vaccines.  Home care    Medicines may be prescribed to help relieve pain. Take these medicines as directed. Ask your healthcare provider or pharmacist before using over-the-counter medicines for helping treat pain and itching.    In certain cases, antiviral medicines may be prescribed to reduce pain, shorten the illness, and prevent neuralgia. Take these medicines as directed.    Compresses made from a  solution of cool water mixed with cornstarch or baking soda may help relieve pain and itching.     Gently wash skin daily with soap and water to help prevent infection.  Be certain to rinse off all of the soap, which can be irritating.    Trim fingernails and try not to scratch. Scratching the sores may leave scars.    Stay home from work or school until all blisters have formed a crust and you are no longer contagious.  Follow-up care  Follow up with your healthcare provider or as directed by our staff.  When to seek medical advice    Fever of 100.4 F (38 C) or higher, or as directed by your healthcare provider    Affected skin is on the face or neck and any of the following occur:  ? Headache  ? Eye pain  ? Changes in vision  ? Sores near the eye  ? Weakness of facial muscles    Pain, redness, or swelling of a joint    Signs of skin infection: colored drainage from the sores, warmth, increasing redness, or increasing pain  Date Last Reviewed: 9/25/2015 2000-2017 [employer]. [address] [place] [address], [place] [address]. All rights reserved. This information is not intended as a substitute for professional medical care. Always follow your healthcare professional's instructions.

## 2021-06-17 NOTE — PROGRESS NOTES
MENTAL HEALTH VISIT NOTE    Date of Service: 4/17/2018    Start time:2:05 pm  Stop time:3:02 pm  This is session number 4 this calendar year.  The patient was seen for individual psychotherapy    No  was required because the patient speaks and understands English.  Shyanne Gonzalez is a 82 y.o. female is being seen today for individual psychotherapy to address the following:    Chief Complaint   Patient presents with     Depression     Anxiety   .     NECESSITY: This individual session was necessary for the care of the patient to address difficulties in psychosocial functioning that are affected by and affect the patient's ability to cope with and heal from spinal conditions and are affected by the patient's mental health diagnosis listed in the diagnosis section below.    New symptoms or complaints: None    Functional Impairment (0-4):   Personal: 3  Family: 1  Work: 2  Social:2    Clinical Assessment of Mental Status  Mood: Depressed and Anxious    Affect:   Congruent with content of speech    Appearance:  well-groomed, casually dressed, engaged    Speech:  Within normal limits    Orientation:   Oriented to person, time, and place    Memory:  No evidence of impairment.    Concentration:  Within normal limits    Focus:   Within normal limits    Fund of knowledge:  adequate    Suicidal Ideation present:   Absent  Suicidal Risk Assessment:  No specific plan to harm self.  Patient does not endorse any intention to harm self.  Patient is aware of resources available if she experiences suicidal ideation.      Homicidal Risk Assessment:   None reported  No crisis plan required due to absence of risk.    Patient's impression of their current status:  Patient believes symptoms are improving overall.  Patient believes psychotherapy sessions continue to be helpful in maintaining skills and insight acquired over past sessions.  She states she has been doing somewhat better with rumination about the leadership, but  she feels depressive symptoms on a more physical level.  Anger is better, however.    Therapist impression of patient's current status:  Symptoms  are improving overall.  The patient continues to build skills to manage symptoms.  While she talks about how lonely she is, she is able to point out a number of people who have reached out to her.  Her depressive filter on this is somewhat worse, but at the same time the rumination about her Alevism is reduced.    Type of psychotherapeutic technique provided:  CBT, motivational interviewing, discussion of the concept of worry time.    Response to psychotherapeutic interventions:  Patient responded to interventions in the following manner: Enthusiastic    Progress toward short term goals:  Progress as expected: Patient is practicing skills taught in psychotherapy and seeing benefits.    Review of long-term goals:  Treatment plan updated to read as follows:   Tx Plan updated 4/17/18:  1. Increase ability to cope well with my chronic health conditions from  some  of the time to most of the time.  2.              Increase % of time I communicate assertively with others from  40% to 80%.  3.  Improve ability to get good sleep from one night out of the week to four nights per week.    Diagnosis:   1. Major depressive disorder, recurrent episode, moderate degree    2. Generalized anxiety disorder      Plan and Follow-Up:  Patient will return for follow-up psychotherapy session in two weeks.    Patient will complete the following homework before our next session:  Patient plans to continue working with above-mentioned skills.  She will attempt to implement worry time and continue with meditation.  Discharge Criteria/ Planning:  Patient will continue with follow-up until therapy can be discontinued without return of symptoms.      Candelaria Barr 4/17/2018

## 2021-06-17 NOTE — PROGRESS NOTES
Arterial concerns of the R wrist. Went to the ED with right arm pain, bruising and swelling on 3/31/18.

## 2021-06-17 NOTE — PROGRESS NOTES
[facility] Treatment Plan - Update          Name: Shyanne Gonzalez  : 1935  MRN: [id number]      Treatment Plan: Initial Treatment Plan  Intake/initial treatment plan date: 2016  Updated:18  Benefit and risks and alternatives have been discussed: Yes  Is this treatment appropriate with minimal intrusion/restrictions: Yes  Estimated duration of treatment: Approximately 20 sessions.  Anticipated frequency of services: Every 2 weeks  Necessity for frequency: This frequency is needed to establish therapeutic goals and for continuity of care in order to monitor progress.  Necessity for treatment: To address cognitive, behavioral, and/or emotional barriers in order to work toward goals and to improve quality of life.       Exercise    Assertiveness     Plan:   1. Problem:  Problem:  Chronic pain  Goal:              Increase ability to cope well with my chronic health conditions from  some  of the time to most of the time.                        Strategies:                 ? [x] Explore thoughts and expectations about self and others                                                                                                                              [x] Explore emotional reactions to illness/injury                                                                     ? [x] Learn and practice relaxation techniques and other coping  strategies                                                                           [x] Implement physical activity routine (with physician approval)                                                                    ? [x] Engage in values clarification and goal-setting                                                                    ? [x] Consider introduction of bibliotherapy and/or videos                                                                    ? [x] Increase involvement in meaningful activities                                                                     ? [x] Discuss sleep hygiene                                                                    ? [x] Process grief (loss of significant person, independence, role,  etc.)                                                                    ? [x] Motivational interviewing                                                                    ? [x] Find ways to build community.      Degree to which this is a problem (1-4): 3  Degree to which goal is met (1-4):2   Date goal was initiated: 8/7/2017    Date of Review: 4/17/2018  Treatment Goal Status:continued as written          2. Problem:                        Voiceless-ness                      Goal:              Increase % of time I communicate assertively with others from  40% to 80%.  Strategies:                 ?[x] Learn assertive communication skills through role-play and  other techniques                                                                    ?[x]  Explore thoughts and expectations about self and others                                                                     ?[x]  Learn and practice relaxation techniques and other coping strategies                                                                    [x] Consider inviting others to attend conjoint sessions                                                                    ?[x] Consider introduction of bibliotherapy and/or videos                                                                    ?[x]  EMDR      Degree to which this is a problem (1-4): 2  Degree to which goal is met (1-4):2  Date goal was initiated: 8/7/2017  Date of Review: 4/17/2018  Treatment Goal Status:continued as written        ? 3. Depression/Sleep                                   Goal:              Improve ability to get good sleep from one night out of the week to four nights per week.                        Strategies:                 ?[x] Decrease social  isolation                                                                    [x] Increase involvement in meaningful activities                                                                    ?[x] Discuss sleep hygiene                                                                    ?[x] Explore thoughts and expectations about self and others                                                                    ?[x] Process grief (loss of significant person, independence, role,                                                                   etc.)                                                                    ?[x] Change sleeping habits                                                                    [x] Consider introduction of bibliotherapy and/or videos                                                                    [x] Continue compliance with medical treatment plan (or explore                                                                    barriers)                                                                        ?    Degree to which this is a problem (1-4): 4  Degree to which goal is met (1-4)0  Date goal was initiated: 2/6/2017  Date of Review: 8/7/2017  Treatment Goal Status:continued as written   Date of Review: 4/17/2018  Treatment Goal Status:continued as written                ________________________________________  Discontinued Goals  Depression   Goal: Increase sense of self worth from 7/10 to 9/10.   Strategies: ?[x] Decrease social isolation  [x] Increase involvement in meaningful activities  ?[] Discuss sleep hygiene  ?[x] Explore thoughts and expectations about self and others  ?[x] Process grief (loss of significant person, independence, role, etc.)  ?[] Assess for suicide risk  ?[x] Implement physical activity routine (with physician approval)  [x] Consider introduction of bibliotherapy and/or videos  [x] Continue compliance with medical treatment plan (or explore  Barriers)  [x] Use narrative techniques and writing.      ?   Degree to which this is a problem (1-4): 1  Degree to which goal is met (1-4)4  Date goal was initiated: 5/16/2016  Date of Review: 8/22/2016  Treatment Goal Status:continued, but revised as follows:changed baseline level to 7/10    Date of Review: 2/6/2017  Treatment Goal Status:continued as written        Date of Review: 8/7/2017  Treatment Goal Status:discontinued as this goal has been met to the satisfaction of the patient      Anxiety   Goal: Develop a greater acceptance of leadership decisions.  Strategies: ? [x]Learn and practice relaxation techniques and other coping strategies (e.g., thought stopping, reframing, meditation)  ? [x] Increase involvement in meaningful activities  ? [] Discuss sleep hygiene  ? [x] Explore thoughts and expectations about self and others  ? [x] Identify and monitor triggers for panic/anxiety symptoms  ? [x] Implement physical activity routine (with physician approval)  ? [x] Consider introduction of bibliotherapy and/or videos  ? [x] Continue compliance with medical treatment plan (or explore barriers)   [x] Clinical hypnosis      Degree to which this is a problem (1-4):2  Degree to which goal is met (1-4):4  Date goal was initiated: 5/16/2016  Date of Review: 8/22/2016  Treatment Goal Status:continued as written    Date of Review: 2/6/2017  Treatment Goal Status:continued, but revised as follows:focus on acceptance    Date of Review: 8/7/2017  Treatment Goal Status:discontinued as this goal has been met to the satisfaction of the patient         Functional Impairment: 1=Not at all/Rarely 2=Some days 3=Most Days 4=Every Day      Personal: 2  Family: 1  Social: 3  Work: 1      Diagnosis:  Major Depressive Disorder, Moderate, Recurrent  Generalized Anxiety Disorder      Strengths: ability for insight, average or above intelligence, capable of independent living, communication skills, general fund of knowledge, motivation  for treatment/growth, patient is voluntary, is willing to work on problems, Mu-ism affiliation, special hobby/interest, work skills   Limitations: The patient has the following limitations: , insufficient support network and chronic pain   Cultural Considerations: Mu-ism: spiritual concerns / distress, general cultural considerations: member of Mu-[employer] order; racial/ethnic background:           Persons responsible for this plan:  ? [x] Patient ? [x] Provider ? [] Other: __________________          Provider: Performed and documented by Candelaria Barr [facility] 4/17/18                      Patient Signature:____________________________________ Date: ______________               Therapist Signature: __________________________________ Date: ______________

## 2021-06-17 NOTE — PROGRESS NOTES
VASCULAR SURGERY CLINIC CONSULTATION    VASCULAR SURGEON: Trent Pham MD    LOCATION:  [facility]    Shyanne Gonzalez   Medical Record #:  [id number]  YOB: 1935  Age:  82 y.o.     Date of Service: 5/7/2018    PRIMARY CARE PROVIDER: Pawan Alexandra MD      Reason for visit: Right upper neck and arm pain swelling at the right wrist.    IMPRESSION: 82-year-old female presented with right neck and arm pain for a couple week duration is vicious for cervical spinal stenosis.    RECOMMENDATION:   I reviewed the findings of the ultrasound of the right upper extremity which confirmed that she had normal multiphasic flow throughout the arteries of the right upper extremity including a normal-appearing ulnar artery of the right wrist.  No images of an abnormality in the right ulnar artery were noted.  We also discussed the findings on physical exam which appear to be normal motor function of the right upper extremity now an improvement after a chiropractic adjustment 2 weeks ago.  Her symptoms were sudden onset and I suspect that this is related to her cervical spine causing a radiculopathy.  At this time there is no vascular surgery concern and I recommend she be referred to a spinal surgeon for evaluation.    HPI:  Shyanne Gonzalez is a 82 y.o. female who was seen today in consultation regarding right upper extremity pain that was present for about 3 week duration of sudden onset.  Patient does not recall any exertional or traumatic force that may have led to the onset of right upper extremity pain 3 weeks ago.  She says that she had severe pain in the right neck just behind the ear that radiated down the right neck and over the deltoid and into the right upper arm.  She says the pain is significant enough that she has limited use of the arm at that time.  She went to the emergency room for evaluation was found to have no obvious medical problem and sent home.  The patient then saw her  chiropractor approximately 2 weeks ago and underwent a cervical spine adjustment and since that time is noted significant improvement in the right upper extremity pain.  She also states that she had swelling at the wrist just overlying the radial artery that has since resolved.  Ultrasound today confirmed there was no swelling in this area and no abnormality.  Her symptoms have improved drastically and her pain is significantly resolved since her adjustment.    PHH:    Past Medical History:   Diagnosis Date     Anxiety      Disc degeneration, lumbar      Fibromyalgia      Pulmonary fibrosis         Past Surgical History:   Procedure Laterality Date     CYSTOCELE REPAIR       PELVIC FLOOR REPAIR       VT TOTAL ABDOM HYSTERECTOMY      Description: Hysterectomy;  Recorded: 07/25/2011;     SPINE SURGERY         ALLERGIES:  Gabapentin; Gluten; Neuromuscular blockers, steroidal; and Prednisone    MEDS:    Current Outpatient Prescriptions:      cholecalciferol, vitamin D3, (D3 DOTS) 2,000 unit Tab, Take 2,000 Units by mouth daily., Disp: , Rfl:      clonazePAM (KLONOPIN) 0.5 MG tablet, Take 0.5 mg by mouth 2 (two) times a day as needed for anxiety., Disp: , Rfl:      conjugated estrogens (PREMARIN) vaginal cream, Insert into the vagina daily., Disp: , Rfl:      cyanocobalamin 1,000 mcg/mL injection, Inject 1,000 mcg into the shoulder, thigh, or buttocks once., Disp: , Rfl:      magnesium citrate solution, Take 296 mL by mouth once., Disp: , Rfl:      naproxen sodium (ALEVE) 220 MG tablet, Take 220 mg by mouth 2 (two) times a day with meals., Disp: , Rfl:     SOCIAL HABITS:    History   Smoking Status     Never Smoker   Smokeless Tobacco     Never Used       History   Alcohol Use No       History   Drug Use No       FAMILY HISTORY:    Family History   Problem Relation Age of Onset     No Medical Problems Mother      No Medical Problems Father      No Medical Problems Sister      No Medical Problems Maternal Grandmother       No Medical Problems Maternal Grandfather      No Medical Problems Paternal Grandmother      No Medical Problems Paternal Grandfather      No Medical Problems Maternal Aunt      No Medical Problems Paternal Aunt      Cancer Brother      Pulmonary fibrosis Brother      Pulmonary fibrosis Nephew      BRCA 1/2 Neg Hx      Breast cancer Neg Hx      Colon cancer Neg Hx      Endometrial cancer Neg Hx      Ovarian cancer Neg Hx        REVIEW OF SYSTEMS:    A 12 point ROS was reviewed and except for what is listed in the HPI above, all others are negative    PE:  /70  Pulse 72  Temp 98.2  F (36.8  C)  Resp 14  Wt Readings from Last 1 Encounters:   10/28/16 157 lb (71.2 kg)     There is no height or weight on file to calculate BMI.    EXAM:  GENERAL: This is a well-developed 82 y.o. female who appears her stated age  EYES: Grossly normal.  MOUTH: Buccal mucosa normal   CARDIAC:  NS1 S2, No Murmur  CHEST/LUNG:  Clear lung fields bilaterally   GASTROINTESINAL (ABDOMEN): Soft, non-tender, B/S present, no pulsatile mass  MUSCULOSKELETAL: Grossly normal and both lower extremities are intact.  HEME/LYMPH: No lymphedema  NEUROLOGIC: Focally intact, Alert and oriented x 3.   PSYCH: appropriate affect  INTEGUMENT: No open lesions or ulcers  Pulse Exam:   Carotid: No Bruit    Radial: Left +2   Right  +2    Femoral: Left +2   Right  +2          DIAGNOSTIC STUDIES:     Images:  Us Arterial Arm Right    Result Date: 5/7/2018  [facility] EXAM: US ARTERIAL ARM RIGHT INDICATION:  82-year-old female with right arm and wrist pain. TECHNIQUE: Ultrasound examination of the right upper extremity arteries was performed, including gray-scale, color, and Doppler waveform analysis. FINDINGS: Widely patent right upper extremity arterial vasculature without hemodynamically significant stenosis. There is triphasic flow in the bilateral radial and ulnar arteries. UPPER EXTREMITY ARTERIAL DOPPLER VELOCITIES (cm/s) RIGHT  Innominate: 87 T Subclavian: 102 T Axillary: 116 T Brachial Prox: 170 T Brachial Mid: 163 T Brachial Antecubital: 123 T Radial Prox: 98 T Radial Mid: 107 T Radial Distal: 87 T Ulnar: 68 T LEFT Radial: 83 T Ulnar: 79 T     1. Widely patent right upper extremity arterial vasculature without hemodynamically significant stenosis. Normal triphasic flow in the bilateral radial and ulnar arteries.      I personally reviewed the images and my interpretation is a normal right upper extremity ultrasound with triphasic waveforms and normal velocities.  View the images reveals no abnormality in the distal radial ulnar artery at the wrist.  So the normal study of the right upper extremity arterial system.    LABS:      Sodium   Date Value Ref Range Status   03/25/2018 134 (L) 136 - 145 mmol/L Final   02/01/2016 138 136 - 145 mmol/L Final   09/22/2015 136 136 - 145 mmol/L Final     Potassium   Date Value Ref Range Status   03/25/2018 4.0 3.5 - 5.0 mmol/L Final   02/01/2016 3.8 3.5 - 5.0 mmol/L Final   09/22/2015 4.1 3.5 - 5.0 mmol/L Final     Chloride   Date Value Ref Range Status   03/25/2018 98 98 - 107 mmol/L Final   02/01/2016 105 98 - 107 mmol/L Final   09/22/2015 105 98 - 107 mmol/L Final     BUN   Date Value Ref Range Status   03/25/2018 10 8 - 28 mg/dL Final   02/01/2016 10 8 - 28 mg/dL Final   09/22/2015 15 8 - 28 mg/dL Final     Creatinine   Date Value Ref Range Status   03/25/2018 0.82 0.60 - 1.10 mg/dL Final   02/01/2016 0.93 0.60 - 1.10 mg/dL Final   09/22/2015 0.82 0.60 - 1.10 mg/dL Final     Hemoglobin   Date Value Ref Range Status   03/25/2018 13.0 12.0 - 16.0 g/dL Final   02/01/2016 13.6 12.0 - 16.0 g/dL Final     Platelets   Date Value Ref Range Status   03/25/2018 189 140 - 440 thou/uL Final   02/01/2016 190 140 - 440 thou/uL Final       Trent Pham MD  VASCULAR SURGERY

## 2021-06-18 NOTE — PROGRESS NOTES
MENTAL HEALTH VISIT NOTE    Date of Service: 6/18/2018    Start time:2:05 pm  Stop time:3:00 pm  This is session number 6 this calendar year.  The patient was seen for individual psychotherapy    No  was required because the patient speaks and understands English.  Shyanne Gonzalez is a 82 y.o. female is being seen today for individual psychotherapy to address the following:    Chief Complaint   Patient presents with     Depression     Anxiety   .     NECESSITY: This individual session was necessary for the care of the patient to address difficulties in psychosocial functioning that are affected by and affect the patient's ability to cope with and heal from spinal conditions and are affected by the patient's mental health diagnosis listed in the diagnosis section below.    New symptoms or complaints: None    Functional Impairment (0-4):   Personal: 2  Family: 1  Work: 2  Social:2    Clinical Assessment of Mental Status  Mood: Euthymic    Affect:   Congruent with content of speech    Appearance:  well-groomed, casually dressed, engaged    Speech:  Within normal limits    Orientation:   Oriented to person, time, and place    Memory:  No evidence of impairment.    Concentration:  Within normal limits    Focus:   Within normal limits    Fund of knowledge:  adequate    Suicidal Ideation present:   Absent  Suicidal Risk Assessment:  No specific plan to harm self.  Patient does not endorse any intention to harm self.  Patient is aware of resources available if she experiences suicidal ideation.      Homicidal Risk Assessment:   None reported  No crisis plan required due to absence of risk.    Patient's impression of their current status:  Overall symptoms are improving.  She has decided to stop worrying about where she is going to end up as far as a living situation in the future, and this is having a beneficial effect on her mental health.    Therapist impression of patient's current status: She is making  progress towards reducing some of the anger that she has held onto for a long time.    Type of psychotherapeutic technique provided:  CBT, motivational interviewing    Response to psychotherapeutic interventions:  Patient responded to interventions in the following manner: Enthusiastic.  She plans to continue to work on the loneliness and creating her own community in order to reduce anger towards the [employer] the political situation in the [employer].    Progress toward short term goals:  Progress as expected: Patient is practicing skills taught in psychotherapy and seeing benefits.    Review of long-term goals:  Not done at today's visit. Tx Plan updated 4/17/18:  1. Increase ability to cope well with my chronic health conditions from  some  of the time to most of the time.  2.              Increase % of time I communicate assertively with others from  40% to 80%.  3.  Improve ability to get good sleep from one night out of the week to four nights per week.  Diagnosis:   1. Major depressive disorder, recurrent episode, moderate degree (H)    2. Generalized anxiety disorder      Plan and Follow-Up:  Patient will return for follow-up psychotherapy session in 2 weeks.  Patient will complete the following homework before our next session:  Patient plans to continue working with above-mentioned skills.  Continue to work on involvement in her community of choice.    Discharge Criteria/ Planning:  Patient will continue with follow-up until therapy can be discontinued without return of symptoms.      Candelaria Barr 6/18/2018

## 2021-06-18 NOTE — PROGRESS NOTES
MENTAL HEALTH VISIT NOTE    Date of Service: 5/23/2018    Start time:2:00 pm  Stop time:3:00 pm  This is session number 5 this calendar year.  The patient was seen for individual psychotherapy    No  was required because the patient speaks and understands English.  Shyanne Gonzalez is a 82 y.o. female is being seen today for individual psychotherapy to address the following:    Chief Complaint   Patient presents with     Depression     Anxiety   .     NECESSITY: This individual session was necessary for the care of the patient to address difficulties in psychosocial functioning that are affected by and affect the patient's ability to cope with and heal from spinal conditions and are affected by the patient's mental health diagnosis listed in the diagnosis section below.    New symptoms or complaints: None    Functional Impairment (0-4):   Personal: 3  Family: 1  Work: 3  Social:2    Clinical Assessment of Mental Status  Mood: Euthymic    Affect:   Congruent with content of speech    Appearance:  well-groomed, casually dressed, engaged    Speech:  Within normal limits    Orientation:   Oriented to person, time, and place    Memory:  No evidence of impairment.    Concentration:  Within normal limits    Focus:   Within normal limits    Fund of knowledge:  adequate    Suicidal Ideation present:   Absent  Suicidal Risk Assessment:  No specific plan to harm self.  Patient does not endorse any intention to harm self.  Patient is aware of resources available if she experiences suicidal ideation.      Homicidal Risk Assessment:   None reported  No crisis plan required due to absence of risk.    Patient's impression of their current status:  She feels that she is overall doing better.  She is noticing a lot of fatigue, but the pain is less.  She is finding that her mood is better and she is very validated by some recent events in her community.    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  This is helpful because it reduces her stress levels.    Type of psychotherapeutic technique provided:  CBT, motivational interviewing, supportive, insight-oriented    Response to psychotherapeutic interventions:  Patient responded to interventions in the following manner: Enthusiastic    Progress toward short term goals:  Progress as expected: Patient is practicing skills taught in psychotherapy and seeing benefits.    Review of long-term goals:  Not done at today's visit. Tx Plan updated 4/17/18:  1. Increase ability to cope well with my chronic health conditions from  some  of the time to most of the time.  2.              Increase % of time I communicate assertively with others from  40% to 80%.  3.  Improve ability to get good sleep from one night out of the week to four nights per week.    Diagnosis:   1. Major depressive disorder, recurrent episode, moderate degree    2. Generalized anxiety disorder      Plan and Follow-Up:  Patient will return for follow-up psychotherapy session in one month.    Patient will complete the following homework before our next session:  Patient plans to continue working with above-mentioned skills.    Discharge Criteria/ Planning:  Patient will continue with follow-up until therapy can be discontinued without return of symptoms.      Candelaria Barr 5/23/2018

## 2021-06-18 NOTE — PROGRESS NOTES
Assessment:   Shyanne Gonzalez is a 82 y.o. y.o. female with past medical history significant for diabetes/depression, fibromyalgia/chronic fatigue syndrome, pulmonary fibrosis who presents today for follow-up regarding a new complaint of neck pain with right radicular arm pain.  The patient likely has cervical foraminal stenosis secondary to degenerative changes causing her symptoms.  There may be an element of myofascial pain as well.  She has had reactivation of her  chronic fatigue syndrome/fibromyalgia and  jet bar virus which complicates her clinical picture. She is neurologically intact other than some mild sensory impairment and without any red flag/myelopathic signs/symptoms.    NDI:   38%  WHO-5:  9 - patient already sees Candelaria Barr here at the spine clinic.       Plan:     A shared decision making plan was used.  The patient's values and choices were respected.  The following represents what was discussed and decided upon by the physician and the patient.      1.  DIAGNOSTIC TESTS: X-rays of the cervical spine specifically asking the radiologist to include C1 and C2 and the imaging was ordered today.  Discussed an MRI of the cervical spine but discussed that this would most likely be helpful if she wished to consider actions.  At this time she does not wish to consider injections and given that her neurologic exam is not concerning, we will hold off on the MRI for now.  This can be considered in the future if her symptoms do not improve with today's treatment plan.  -AST/ALT liver function tests are ordered today.  These were resulted and are normal.  Nurse joão was asked to call the patient and let her know that these were normal and that she can again taking the tizanidine as discussed at her visit today.  2.  PHYSICAL THERAPY: An order for physical therapy was provided today.  The patient would like to go to physical therapy on [address] but cannot remember the name.  She is  given a hard copy of the physical therapy order and told that she can take the order there and then schedule her physical therapy.  She is encouraged to do her home exercise program on a regular basis.  3.  MEDICATIONS: Tizanidine 2 mg as prescribed today.  She can take 1 tablet every 8 hours as needed for pain.  Cautioned the patient that this medication may cause drowsiness.  She may want to only take it at bedtime if it makes her quite drowsy.  The first time she takes it during the day, she should not drive or have to make any important decisions until she knows how it affects her.  4.  INTERVENTIONS: Patient is not interested in injections.  She states that she has a very bad reaction to prednisone.  These were not discussed today.  A cervical epidural steroid injection could be performed if she changes her mind.  Advanced imaging would have to be performed prior to an injection.  5.  PATIENT EDUCATION:    -Patient's questions were answered to her satisfaction today.  She was in agreement with the treatment plan.  6.  FOLLOW-UP: The patient preferred to have the nurse navigator call her in 4 weeks as opposed to coming in for a follow-up appointment.  If she is doing better at that time she can follow-up as needed.  If she is not doing better she can return for a follow-up visit.  She was given the nurse navigation phone number and encouraged to call if she has any questions, concerns, or any significant worsening of her symptoms prior to that time.  She can also contact Dr. Llamas through my chart.    A total of 40 minutes was spent in the care of this patient.  Greater than 50% of the time was spent counseling, teaching, answering the patient's questions.    Subjective:     Shyanne Gonzalez is a 82 y.o. female who presents today for follow-up regarding neck pain with right radicular arm pain.  The patient reports that the pain started on March 26.  She went to the ER because she is having left flank and  kidney pain.  She had a venipuncture in the right arm.  They did a CT scan and sent her home.  4 days later she had swelling over her right radial artery.  Her right arm became very weak and it was hard for her to use her arm for 2-3 weeks after that ER visit.  She then went back to the ER for jaw pain neck pain and right arm pain.  The doctor for a blood clot and she says this was negative.  She reports that her strength has returned in the arm but she continues to have neck pain with radiation into the arm.  The pain goes into the biceps and the triceps making very tender to the touch.  She also has tenderness over the extensor proximal forearm.  She denies any numbness or tingling.  Again the weakness in her right arm has significantly improved.  She denies any symptoms in the left arm.  The patient has been going to a chiropractor for craniosacral and acupuncture.  She has gone 3 times since the pain started in March.  She feels that this is not helping.  Pain is mainly located in the right neck as well.  She gets clicking when she turns her head to the right.  She has been doing some acupressure points to which do provide some mild relief.  However it is very hard for her to sleep secondary to the pain.  She does have some occasional headaches which she thinks are related to the pain.  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  She is on a whole regime of supplements to combat this.    Past medical history is reviewed and is significant for anxiety/depression, fibromyalgia/chronic fatigue syndrome, Ynr-Barr virus, pulmonary fibrosis, chronic low back pain with radicular symptoms.  The patient also states that she has a new diagnosis of peripheral neuropathy.    Surgical history is reviewed and is significant for lumbar surgery in 2013, enterocele repair, cystocele and enterocele  repair, hysterectomy, pelvic reconstruction, mesh removal.    Family history is reviewed and is significant for single cell carcinoma, stroke, lupus.    Social history is reviewed and the patient is a retired none.    Allergies: The patient says that prednisone gives her significant side effects where she cannot eat or sleep.  Gabapentin makes her nauseous.    Medications are reviewed and are updated in her chart.      Review of Systems:  Positive for changes in vision, hoarseness, dizziness, anxiety, depression, changes in bowel movements (alternating constipation and loose stools), easy bruising, rash, poor sleep quality, back pain, joint pain, black stools, headache, worrying, cold intolerance, color changes in her legs.  Her primary care physician is aware of all of the symptoms and none of them are new.  Otherwise 13 systems reviewed are negative.  Please see the patient intake packet from today's date scanned under the media tab for details.     Objective:   CONSTITUTIONAL:  Vital signs as above.  No acute distress.  The patient is well nourished and well groomed.    PSYCHIATRIC:  The patient is awake, alert, oriented to person, place and time.  The patient is answering questions appropriately with clear speech.  Normal affect.  SKIN:  Skin over the face, neck bilateral upper extremities is clean, dry, intact without rashes.  MUSCULOSKELETAL: The patient has 5/5 strength for the bilateral shoulder abductors, elbow flexors/extensors, wrist extensors, finger flexors/abductors.  Patient does have tenderness over the right cervical paraspinal muscles and over the right upper trapezius muscle.  There is increased hypertonicity over the right upper trapezius muscle compared to the left.  Motion of the cervical spine is mildly restricted with flexion, extension, side bending, rotation.  She does report pain with rotation to the right.  NEUROLOGICAL:  2/4  Biceps, brachioradialis, triceps reflexes bilaterally.   Negative Chacon's bilaterally.  Sensation to pinprick is intact in all the digits of both upper extremities.    RESULTS: No imaging of the cervical spine.

## 2021-06-19 ENCOUNTER — HEALTH MAINTENANCE LETTER (OUTPATIENT)
Age: 86
End: 2021-06-19

## 2021-06-19 NOTE — PROGRESS NOTES
MENTAL HEALTH VISIT NOTE    Date of Service: 7/18/2018    Start time:2:00 pm  Stop time:2:50 pm  This is session number 7 this calendar year.  The patient was seen for individual psychotherapy    No  was required because the patient speaks and understands English.  Shyanne Gonzalez is a 82 y.o. female is being seen today for individual psychotherapy to address the following:    Chief Complaint   Patient presents with     Depression     Anxiety   .     NECESSITY: This individual session was necessary for the care of the patient to address difficulties in psychosocial functioning that are affected by and affect the patient's ability to cope with and heal from spinal conditions and are affected by the patient's mental health diagnosis listed in the diagnosis section below.    New symptoms or complaints: None    Functional Impairment (0-4):   Personal: 2  Family: 1  Work: 2  Social:3    Clinical Assessment of Mental Status  Mood: Euthymic    Affect:   Congruent with content of speech    Appearance:  well-groomed, casually dressed, engaged    Speech:  Within normal limits    Orientation:   Oriented to person, time, and place    Memory:  No evidence of impairment.    Concentration:  Within normal limits    Focus:   Within normal limits    Fund of knowledge:  adequate    Suicidal Ideation present:   Absent  Suicidal Risk Assessment:  No specific plan to harm self.  Patient does not endorse any intention to harm self.  Patient is aware of resources available if she experiences suicidal ideation.      Homicidal Risk Assessment:   None reported  No crisis plan required due to absence of risk.    Patient's impression of their current status:  She notes improvements in her overall mood and involvement with self-care.    Therapist impression of patient's current status: She continues to use coping strategies to help herself feel better in regards to anxiety and pain.  She is making progress on feeling part of a  community.    Type of psychotherapeutic technique provided:  CBT, motivational interviewing    Response to psychotherapeutic interventions:  Patient responded to interventions in the following manner: Enthusiastic.    Progress toward short term goals:  Progress as expected: Patient is practicing skills taught in psychotherapy and seeing benefits.    Review of long-term goals:  Not done at today's visit. Tx Plan updated 4/17/18:  1. Increase ability to cope well with my chronic health conditions from  some  of the time to most of the time.  2.              Increase % of time I communicate assertively with others from  40% to 80%.  3.  Improve ability to get good sleep from one night out of the week to four nights per week.    Diagnosis:   1. Major depressive disorder, recurrent episode, moderate degree (H)    2. Generalized anxiety disorder      Plan and Follow-Up:  Patient will return for follow-up psychotherapy session in one month.    Patient will complete the following homework before our next session:  Patient plans to continue working with above-mentioned skills.    Discharge Criteria/ Planning:  Patient will continue with follow-up until therapy can be discontinued without return of symptoms.  We will discuss in our next session more of the guilt that she feels related to not being part of the community that she feels excluded from.      Candelaria Barr 7/18/2018

## 2021-06-19 NOTE — PROGRESS NOTES
ASSESSMENT & PLAN:  1. RUQ abdominal pain  Exam is not worrisome today, I do not feel she needs imaging today.  At this point would advise patient see a specialist as her chronic intermittent abdominal pain continues, she is bothered by it, cause is not clear, and she has had a number of tests already.  Defer further imaging or other testing to GI.  Discussed signs or symptoms for which to go to the emergency room.  - Ambulatory referral to Gastroenterology    2. Rotator cuff tear  - Ambulatory referral to Orthopedics    3. Cervical radiculopathy  She requests referral to [place] [facility] for this instead of [facility] spine care.  - Ambulatory referral to Orthopedics      There are no Patient Instructions on file for this visit.    Orders Placed This Encounter   Procedures     Ambulatory referral to Gastroenterology     Referral Priority:   Routine     Referral Type:   Consultation     Referral Reason:   Evaluation and Treatment     Requested Specialty:   Gastroenterology     Number of Visits Requested:   1     Ambulatory referral to Orthopedics     Referral Priority:   Routine     Referral Type:   Consultation     Referral Reason:   Evaluation and Treatment     Requested Specialty:   Orthopedics     Number of Visits Requested:   1     Ambulatory referral to Orthopedics     Referral Priority:   Routine     Referral Type:   Consultation     Referral Reason:   Evaluation and Treatment     Requested Specialty:   Orthopedics     Number of Visits Requested:   1     There are no discontinued medications.    No Follow-up on file.    CHIEF COMPLAINT:  Chief Complaint   Patient presents with     Abdominal Pain     ongoing for 2 months, states that she feels very bloated but reports normal bowel movements.        HISTORY OF PRESENT ILLNESS:  Shyanne is a 82 y.o. female presenting to the clinic today for abdominal pain.  Intermittent episodes for years, but more bothersome over the past 2 months.  Feels very bloated.  Had a  "CT abdomen and pelvis performed in March for flank pain at that time CT showed significant constipation.  She states at the time she probably was constipated but does not really feel that she is currently.  Pain seems to be worse at night especially if lying on the right side.  Pain is always right-sided.  Feels she follows a fairly healthy diet.  Cannot identify specific foods that aggravate symptoms.  History of hysterectomy in 1996 with cystocele and rectocele repair.  Then in 2001 required pelvic floor surgery with mesh, which she states was later determined to be problematic.  Her ovaries are still in place.  Last colonoscopy was 2015, she thought she had a polyp but her PCP reviewed it and told her it was \"normal tissue\".  No nausea, vomiting, or diarrhea.  No blood in the stool.  No fevers, chills, or weight loss.  In 2016 she had HIDA scan.  Has not seen a specialist.  Has been aware of gallstones for years but not willing to pursue cholecystectomy electively.    Has had a number of valvular health issues going on recently with right arm pain, found to have a right shoulder supraspinatus tendon tear recently.  Also cervical spine issues.  Followed by spine clinic and physical therapy was ordered there.  PCP is with [facility].    REVIEW OF SYSTEMS:   All other systems are negative.    PFSH:  As per HPI.  In addition, fibromyalgia, anxiety, pulmonary fibrosis, neuropathy, insomnia for which she uses amitriptyline.  LIN martinez.  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    TOBACCO USE:  History   Smoking Status     Never Smoker   Smokeless Tobacco     Never Used       VITALS:  Vitals:    08/09/18 1119   BP: 118/62   Patient Site: Left Arm   Patient Position: Sitting   Cuff Size: Adult Regular   Pulse: 68   Resp: 14   Weight: 146 lb 14.4 oz (66.6 kg)   Height: 5' 4\" (1.626 m)     Wt Readings from Last 3 Encounters:   08/09/18 146 lb 14.4 oz (66.6 kg)   07/12/18 157 lb (71.2 kg) "   10/28/16 157 lb (71.2 kg)     Body mass index is 25.22 kg/(m^2).    PHYSICAL EXAM:  GENERAL: Pleasant, well-appearing patient in no acute distress.   HEENT: Pupils equal round reactive to light. Sclerae and conjunctivae clear. Oropharynx is clear with moist mucous membranes.   NECK: Supple without lymphadenopathy, no carotid bruits   CARDIOVASCULAR: Heart regular rate and rhythm without murmur normal S1-S2   ABDOMEN: Soft, nondistended.    Right upper quadrant tenderness to palpation without guarding or rebound.    No organomegaly or masses appreciated.  LUNGS: Clear to auscultation bilaterally, good air movement throughout   EXTREMITIES Warm and well-perfused without edema. Pedal pulses palpable and symmetric bilaterally   NEURO: Alert and oriented. Grossly nonfocal.   PSYCHIATRIC: Presents on time and well groomed. Normal speech and thought content. Full affect. No abnormal movements or behaviors noted.          MEDICATIONS:  Current Outpatient Prescriptions   Medication Sig Dispense Refill     amitriptyline (ELAVIL) 10 MG tablet Take 20 mg by mouth at bedtime.       cholecalciferol, vitamin D3, (D3 DOTS) 2,000 unit Tab Take 5,000 Units by mouth daily.        clonazePAM (KLONOPIN) 0.5 MG tablet Take 0.5 mg by mouth 2 (two) times a day as needed for anxiety.       cyanocobalamin 1,000 mcg/mL injection Inject 1,000 mcg into the shoulder, thigh, or buttocks once.       magnesium citrate solution Take 325 mL by mouth once.        naproxen sodium (ALEVE) 220 MG tablet Take 220 mg by mouth 2 (two) times a day with meals.       No current facility-administered medications for this visit.

## 2021-06-19 NOTE — PROGRESS NOTES
Assessment:   Shyanne Gonzalez is a 82 y.o. y.o. female with past medical history significant for depression, fibromyalgia, chronic fatigue syndrome, pulmonary fibrosis who presents today for follow-up regarding right neck pain with radiation into the right upper extremity.  X-ray cervical spine shows degenerative changes including degenerative disc disease and multilevel moderate facet arthropathy.  I am concerned that she may have a disc bulge/herniation or spondylitic foraminal stenosis resulting in right upper extremity radicular pain.  Additionally, patient is having significant right shoulder pain for the past 6 weeks since she dropped a garbage can on the top of her right shoulder.  She may have pathology intrinsic to the right shoulder as well.  Over the past 2 months, patient has noted some improvement in the strength of the right arm, however, she feels her neck pain is actually becoming more severe.       Plan:     A shared decision making plan was used.  The patient's values and choices were respected.  The following represents what was discussed and decided upon by the physician assistant and the patient.      1.  DIAGNOSTIC TESTS: I reviewed the x-rays of the cervical spine.  Given the patient's persistent pain, I have ordered an MRI cervical spine for further evaluation.  I also ordered an MRI of the right shoulder given her significant right shoulder pain and history of trauma to the right shoulder.    2.  PHYSICAL THERAPY: Patient will likely benefit from physical therapy.  This is been ordered by Dr. Farhad monge in May.  Patient did not attend physical therapy because she states that she was going out of town and was busy with other doctors appointments.  I do think patient would like to physical therapy.  I held off on ordering this until I seen the results of her MRI scans.      3.  MEDICATIONS: No changes are made to the patient's medications.  She uses Aleve as needed.  She has tizanidine  available which she takes sparingly, although she is not sure it is really helping.    4.  INTERVENTIONS: No interventions were ordered.    5.  PATIENT EDUCATION: Patient is in agreement with the above plan.  All questions were answered.    6.  FOLLOW-UP: A nurse will call the patient with results of her imaging studies.  At that time I may refer the patient to physical therapy and have her follow-up with me in several weeks.  Alternatively, I may have the patient return to the clinic to review the results in person and discuss treatment options.  She has any questions or concerns in the meantime, she should not hesitate contact our clinic.    Subjective:     Shyanne Gonzalez is a 82 y.o. female who presents today for follow-up regarding right neck pain with radiation into the right upper extremity.  Patient was last seen in the clinic on May 16, 2018 by Dr. Llamas.  She requested a transfer of care.  Patient has had right neck and arm pain since March 26.  Initially she had significant weakness in the right arm as well, but that has improved.  She also has swelling over her right radial artery which has resolved.  Patient saw vascular specialist who thought that the swelling in the right arm was likely related to her cervical spine.  Dr. Llamas prescribed tizanidine and referred her to physical therapy.  Patient states that she tried the tizanidine but does not feel is very helpful.  She did not go to physical therapy.  She states that that is because she was out of town and busy with other doctors appointments.  Patient feels that the right arm weakness has improved significantly.  Unfortunately, the neck pain is worsening.  Additionally, the patient states that about 6 weeks ago she was lifting her garbage can to empty it into a dumpster.  She dropped the garbage can, and it landed on top of the right shoulder.  She has had significant shoulder pain since then.    Patient complains of right neck pain.  Pain  extends into the right shoulder blade and right shoulder.  When it is most severe it also extends into the biceps and radial forearm.  She has slight residual weakness in the right arm, but she feels that that is just about back to normal.  She denies numbness or tingling down the arm.  She rates her pain today as a 4 out of 10.  At its best it is a 2 out of 10.  At its worst it is a 9 out of 10.  Pain is aggravated with using her right arm and hand.  Is also severe at night.  She is having difficulty finding a comfortable position to sleep.  Pain is alleviated with doing acupressure, chair yoga, and chiropractic manipulation.    As mentioned above, patient did not go to physical therapy.  She uses Aleve as needed.  She has the tizanidine available which she takes sparingly.  Patient did recently restart Elavil to help her sleep.    Past medical history is reviewed and is pertinent for documented garbage can on her right shoulder with increased right shoulder pain.    Family history is reviewed and is unchanged in the interim.    Review of Systems:  Positive for loss of bladder control, weakness, headache, balance changes.  Negative for numbness/tingling, footdrop, dizziness, nausea/vomiting, blurry vision, balance changes.     Objective:   CONSTITUTIONAL:  Vital signs as above.  No acute distress.  The patient is well nourished and well groomed.    PSYCHIATRIC:  The patient is awake, alert, oriented to person, place and time.  The patient is answering questions appropriately with clear speech.  Normal affect.  HEENT: Normocephalic, atraumatic.  Sclera clear.  Neck is supple.  SKIN:  Skin over the face, neck bilateral upper extremities is clean, dry, intact without rashes.  MUSCULOSKELETAL: Patient has hypertonicity with some tenderness to palpation of the right upper trapezius muscle and right cervical paraspinous muscles.  Patient also has mild tenderness to palpation along the right shoulder, anterior and  lateral.  Range of motion of the cervical spine is mildly restricted with flexion with increased pain and lateral rotation to the right.  Patient had a positive Kemps test on the right.  The patient has 5/5 strength for the bilateral shoulder abductors, elbow flexors/extensors, wrist extensors, finger flexors/abductors.  Range of motion of the right shoulder is full.  Positive Faustin sign on the right.  Negative speeds test on the right.    NEUROLOGICAL: 2+ biceps, brachioradialis, triceps reflexes bilaterally.  Negative Chacon's bilaterally.  Sensation to light touch is intact over bilateral upper extremities throughout.    RESULTS:    [facility]  X-RAY CERVICAL SPINE, AP LATERAL VIEWS 3 VIEWS OR LESS  6/4/2018 9:45 AM     INDICATION: Neck pain, jaw pain - Please make sure to include C1 and C2  COMPARISON: 09/12/2014 cervical spine MRI.     FINDINGS: Cervical spine visualized from skull base through C7. Leftward  curvature to the lower cervical and visualized upper thoracic spine. Minimal  anterolisthesis of C5 on C6. Mild diffuse osteopenia. Vertebral body heights  relatively maintained. Moderate C6-C7 degenerative disc disease with a bulky  anterior marginal osteophyte. Mild C4-C5 degenerative disc disease. Multilevel  moderate facet arthropathy throughout the cervical spine. Moderate atlantodental  degenerative change. No prevertebral soft tissue swelling. Leftward deviation of  the upper trachea relating to the large thyroid nodule previously seen on  ultrasound. Remainder negative.

## 2021-06-20 NOTE — PROGRESS NOTES
MENTAL HEALTH VISIT NOTE    Date of Service: 8/29/18  Start time:2:00 pm  Stop time:2:50 pm  This is session number 8 this calendar year.  The patient was seen for individual psychotherapy    No  was required because the patient speaks and understands English.  Shyanne Gonzalez is a 82 y.o. female is being seen today for individual psychotherapy to address the following:    Chief Complaint   Patient presents with     Depression     Anxiety   .     NECESSITY: This individual session was necessary for the care of the patient to address difficulties in psychosocial functioning that are affected by and affect the patient's ability to cope with and heal from spinal conditions and are affected by the patient's mental health diagnosis listed in the diagnosis section below.    New symptoms or complaints: None    Functional Impairment (0-4):   Personal: 2  Family: 1  Work: 2  Social:2    Clinical Assessment of Mental Status  Mood: Euthymic    Affect:   Congruent with content of speech    Appearance:  well-groomed, casually dressed, engaged    Speech:  Within normal limits    Orientation:   Oriented to person, time, and place    Memory:  No evidence of impairment.    Concentration:  Within normal limits    Focus:   Within normal limits    Fund of knowledge:  adequate    Suicidal Ideation present:   Absent  Suicidal Risk Assessment:  No specific plan to harm self.  Patient does not endorse any intention to harm self.  Patient is aware of resources available if she experiences suicidal ideation.      Homicidal Risk Assessment:   None reported  No crisis plan required due to absence of risk.    Patient's impression of their current status:  She notes improvements in self-care and in building community around her. She feels that her motivation to engage in activities she has to do is lower than it used to be.    Therapist impression of patient's current status: Overall symptoms are stable. She reviews some incidents  in which she felt  disregarded by medical providers not in our office.    Type of psychotherapeutic technique provided:  CBT, motivational interviewing    Response to psychotherapeutic interventions:  Patient responded to interventions in the following manner: Accepting.    Progress toward short term goals:  Progress as expected: Patient is practicing skills taught in psychotherapy and seeing benefits.    Review of long-term goals:  Not done at today's visit. Tx Plan updated 4/17/18:  1. Increase ability to cope well with my chronic health conditions from  some  of the time to most of the time.  2.              Increase % of time I communicate assertively with others from  40% to 80%.  3.  Improve ability to get good sleep from one night out of the week to four nights per week.  Diagnosis:   1. Major depressive disorder, recurrent episode, moderate degree (H)    2. Generalized anxiety disorder      Plan and Follow-Up:  Patient will return for follow-up psychotherapy session in one month.    Patient will complete the following homework before our next session:  Patient plans to continue working with above-mentioned skills. Continue to use principles of behavioral activation.    Discharge Criteria/ Planning:  Patient will continue with follow-up until therapy can be discontinued without return of symptoms.      Candelaria Barr 8/30/2018

## 2021-06-20 NOTE — PROGRESS NOTES
MENTAL HEALTH VISIT NOTE    Date of Service: 9/26/2018    Start time:3:05 pm  Stop time:3:55 pm  This is session number 9 this calendar year.  The patient was seen for individual psychotherapy    No  was required because the patient speaks and understands English.  Shyanne Gonzalez is a 83 y.o. female is being seen today for individual psychotherapy to address the following:    Chief Complaint   Patient presents with     Depression     Anxiety   .     NECESSITY: This individual session was necessary for the care of the patient to address difficulties in psychosocial functioning that are affected by and affect the patient's ability to cope with and heal from spinal conditions and are affected by the patient's mental health diagnosis listed in the diagnosis section below.    New symptoms or complaints: None    Functional Impairment (0-4):   Personal: 3  Family: 1  Work: 2  Social:3    Clinical Assessment of Mental Status  Mood: Anxious    Affect:   Congruent with content of speech    Appearance:  well-groomed, casually dressed, engaged    Speech:  Within normal limits    Orientation:   Oriented to person, time, and place    Memory:  No evidence of impairment.    Concentration:  Within normal limits    Focus:   Within normal limits    Fund of knowledge:  adequate    Suicidal Ideation present:   Absent  Suicidal Risk Assessment:  No specific plan to harm self.  Patient does not endorse any intention to harm self.  Patient is aware of resources available if she experiences suicidal ideation.      Homicidal Risk Assessment:   None reported  No crisis plan required due to absence of risk.    Patient's impression of their current status:  Symptoms continue to improve.  She is concerned about her health and feels that she is experiencing more medical symptoms.  Overall she is more at peace with the uncertainty about her future.    Therapist impression of patient's current status: Continuing to improve.  She  continues to engage in self-care practices that are helpful to her.    Type of psychotherapeutic technique provided:  CBT, motivational interviewing    Response to psychotherapeutic interventions:  Patient responded to interventions in the following manner: Enthusiastic.    Progress toward short term goals:  Progress as expected: Patient is practicing skills taught in psychotherapy and seeing benefits.    Review of long-term goals:  Not done at today's visit. Tx Plan updated 4/17/18:  1. Increase ability to cope well with my chronic health conditions from  some  of the time to most of the time.  2.              Increase % of time I communicate assertively with others from  40% to 80%.  3.  Improve ability to get good sleep from one night out of the week to four nights per week.    Diagnosis:   1. Major depressive disorder, recurrent episode, moderate degree (H)    2. Generalized anxiety disorder      Plan and Follow-Up:  Patient will return for follow-up psychotherapy session in one month.    Patient will complete the following homework before our next session:  Patient plans to continue working with above-mentioned skills.    Discharge Criteria/ Planning:  Patient will continue with follow-up until therapy can be discontinued without return of symptoms.      Candelaria Barr 9/26/2018

## 2021-06-21 NOTE — PROGRESS NOTES
MENTAL HEALTH VISIT NOTE    Date of Service: 11/7/2018    Start time:3:00 pm  Stop time:3:50 pm  This is session number 10 this calendar year.  The patient was seen for individual psychotherapy    No  was required because the patient speaks and understands English.  Shyanne Gonzalez is a 83 y.o. female is being seen today for individual psychotherapy to address the following:    Chief Complaint   Patient presents with     Depression     Anxiety   .     NECESSITY: This individual session was necessary for the care of the patient to address difficulties in psychosocial functioning that are affected by and affect the patient's ability to cope with and heal from spinal conditions and are affected by the patient's mental health diagnosis listed in the diagnosis section below.    New symptoms or complaints: None    Functional Impairment (0-4):   Personal: 3  Family: 1  Work: 2  Social:2    Clinical Assessment of Mental Status  Mood: Euthymic    Affect:   Congruent with content of speech    Appearance:  well-groomed, casually dressed, engaged    Speech:  Within normal limits    Orientation:   Oriented to person, time, and place    Memory:  No evidence of impairment.    Concentration:  Within normal limits    Focus:   Distractible    Fund of knowledge:  adequate    Suicidal Ideation present:   Absent  Suicidal Risk Assessment:  No specific plan to harm self.  Patient does not endorse any intention to harm self.  Patient is aware of resources available if she experiences suicidal ideation.      Homicidal Risk Assessment:   None reported  No crisis plan required due to absence of risk.    Patient's impression of their current status:  Overall symptoms are staying about the same.  She is looking forward to being able to go to [place] to be with the other sisters within the next year.    Therapist impression of patient's current status: Symptoms are staying about the same.  She continues to have a great deal of  anxiety about her health.    Type of psychotherapeutic technique provided:  Supportive therapy, interpersonal    Response to psychotherapeutic interventions:  Patient responded to interventions in the following manner: Enthusiastic  Progress toward short term goals:  Progress as expected: Patient is practicing skills taught in psychotherapy and seeing benefits.    Review of long-term goals:  Not done at today's visit.  We began to review goals, but were unable to complete them.  Tx Plan updated 4/17/18:  1. Increase ability to cope well with my chronic health conditions from  some  of the time to most of the time.  2.              Increase % of time I communicate assertively with others from  40% to 80%.  3.  Improve ability to get good sleep from one night out of the week to four nights per week.    Diagnosis:   1. Major depressive disorder, recurrent episode, moderate degree (H)    2. Generalized anxiety disorder      Plan and Follow-Up:  Patient will return for follow-up psychotherapy session in 3 weeks.    Patient will complete the following homework before our next session:  Patient plans to continue working with above-mentioned skills.    Discharge Criteria/ Planning:  Patient will continue with follow-up until therapy can be discontinued without return of symptoms.      Candelaria Barr 11/7/2018

## 2021-06-21 NOTE — PROGRESS NOTES
[facility] Treatment Plan - Update          Name: Shyanne Gonzalez  : 1935  MRN: [id number]      Treatment Plan: Initial Treatment Plan  Intake/initial treatment plan date: 2016  Updated:18  Benefit and risks and alternatives have been discussed: Yes  Is this treatment appropriate with minimal intrusion/restrictions: Yes  Estimated duration of treatment: Approximately 20 sessions.  Anticipated frequency of services: Every 2 weeks  Necessity for frequency: This frequency is needed to establish therapeutic goals and for continuity of care in order to monitor progress.  Necessity for treatment: To address cognitive, behavioral, and/or emotional barriers in order to work toward goals and to improve quality of life.        Exercise    Assertiveness      Plan:   1. Problem:  Problem:  Chronic pain  Goal:              Increase ability to cope well with my chronic health conditions from  some  of the time to most of the time.                        Strategies:                 ? [x] Explore thoughts and expectations about self and others                                                                                                                              [x] Explore emotional reactions to illness/injury                                                                     ? [x] Learn and practice relaxation techniques and other coping  strategies                                                                           [x] Implement physical activity routine (with physician approval)                                                                    ? [x] Engage in values clarification and goal-setting                                                                    ? [x] Consider introduction of bibliotherapy and/or videos                                                                    ? [x] Increase involvement in meaningful  activities                                                                    ? [x] Discuss sleep hygiene                                                                    ? [x] Process grief (loss of significant person, independence, role,  etc.)                                                                    ? [x] Motivational interviewing                                                                    ? [x] Find ways to build community.     Degree to which this is a problem (1-4): 3  Degree to which goal is met (1-4):2   Date goal was initiated: 8/7/2017    Date of Review: 4/17/2018  Treatment Goal Status:continued as written             2. Problem:                        Voiceless-ness                      Goal:              Increase % of time I communicate assertively with others from  40% to 80%.  Strategies:                 ?[x] Learn assertive communication skills through role-play and  other techniques                                                                    ?[x]  Explore thoughts and expectations about self and others                                                                     ?[x]  Learn and practice relaxation techniques and other coping strategies                                                                    [x] Consider inviting others to attend conjoint sessions                                                                    ?[x] Consider introduction of bibliotherapy and/or videos                                                                    ?[x]  EMDR      Degree to which this is a problem (1-4): 2  Degree to which goal is met (1-4):2  Date goal was initiated: 8/7/2017  Date of Review: 4/17/2018  Treatment Goal Status:continued as written         ? 3. Depression/Sleep                                   Goal:              Improve ability to get good sleep from one night out of the week to four nights per week.                        Strategies:                  ?[x] Decrease social isolation                                                                    [x] Increase involvement in meaningful activities                                                                    ?[x] Discuss sleep hygiene                                                                    ?[x] Explore thoughts and expectations about self and others                                                                    ?[x] Process grief (loss of significant person, independence, role,                                                                   etc.)                                                                    ?[x] Change sleeping habits                                                                    [x] Consider introduction of bibliotherapy and/or videos                                                                    [x] Continue compliance with medical treatment plan (or explore                                                                    barriers)                                                                        ?    Degree to which this is a problem (1-4): 4  Degree to which goal is met (1-4)0  Date goal was initiated: 2/6/2017  Date of Review: 8/7/2017  Treatment Goal Status:continued as written   Date of Review: 4/17/2018  Treatment Goal Status:continued as written                     ________________________________________  Discontinued Goals  Depression   Goal: Increase sense of self worth from 7/10 to 9/10.   Strategies: ?[x] Decrease social isolation  [x] Increase involvement in meaningful activities  ?[] Discuss sleep hygiene  ?[x] Explore thoughts and expectations about self and others  ?[x] Process grief (loss of significant person, independence, role, etc.)  ?[] Assess for suicide risk  ?[x] Implement physical activity routine (with physician approval)  [x] Consider introduction of bibliotherapy and/or videos  [x] Continue compliance with  medical treatment plan (or explore Barriers)  [x] Use narrative techniques and writing.      ?   Degree to which this is a problem (1-4): 1  Degree to which goal is met (1-4)4  Date goal was initiated: 5/16/2016  Date of Review: 8/22/2016  Treatment Goal Status:continued, but revised as follows:changed baseline level to 7/10    Date of Review: 2/6/2017  Treatment Goal Status:continued as written        Date of Review: 8/7/2017  Treatment Goal Status:discontinued as this goal has been met to the satisfaction of the patient       Anxiety   Goal: Develop a greater acceptance of leadership decisions.  Strategies: ? [x]Learn and practice relaxation techniques and other coping strategies (e.g., thought stopping, reframing, meditation)  ? [x] Increase involvement in meaningful activities  ? [] Discuss sleep hygiene  ? [x] Explore thoughts and expectations about self and others  ? [x] Identify and monitor triggers for panic/anxiety symptoms  ? [x] Implement physical activity routine (with physician approval)  ? [x] Consider introduction of bibliotherapy and/or videos  ? [x] Continue compliance with medical treatment plan (or explore barriers)   [x] Clinical hypnosis      Degree to which this is a problem (1-4):2  Degree to which goal is met (1-4):4  Date goal was initiated: 5/16/2016  Date of Review: 8/22/2016  Treatment Goal Status:continued as written    Date of Review: 2/6/2017  Treatment Goal Status:continued, but revised as follows:focus on acceptance    Date of Review: 8/7/2017  Treatment Goal Status:discontinued as this goal has been met to the satisfaction of the patient           Functional Impairment: 1=Not at all/Rarely 2=Some days 3=Most Days 4=Every Day      Personal: 2  Family: 1  Social: 3  Work: 1      Diagnosis:  Major Depressive Disorder, Moderate, Recurrent  Generalized Anxiety Disorder      Strengths: ability for insight, average or above intelligence, capable of independent living, communication skills,  general fund of knowledge, motivation for treatment/growth, patient is voluntary, is willing to work on problems, Zoroastrianism affiliation, special hobby/interest, work skills   Limitations: The patient has the following limitations: , insufficient support network and chronic pain   Cultural Considerations: Zoroastrianism: spiritual concerns / distress, general cultural considerations: member of [employer] order; racial/ethnic background:           Persons responsible for this plan:  ? [x] Patient ? [x] Provider ? [] Other: __________________          Provider: Performed and documented by Candelaria Barr, [facility] 11/7/18                      Patient Signature:____________________________________ Date: ______________               Therapist Signature: __________________________________ Date: ______________

## 2021-06-22 NOTE — PROGRESS NOTES
MENTAL HEALTH VISIT NOTE    Date of Service: 12/12/2018    Start time:2:04 pm  Stop time:2:55 pm  This is session number 11 this calendar year.  The patient was seen for individual psychotherapy    No  was required because the patient speaks and understands English.  Shyanne Gonzalez is a 83 y.o. female is being seen today for individual psychotherapy to address the following:    Chief Complaint   Patient presents with     Depression     Anxiety   .     NECESSITY: This individual session was necessary for the care of the patient to address difficulties in psychosocial functioning that are affected by and affect the patient's ability to cope with and heal from spinal conditions and are affected by the patient's mental health diagnosis listed in the diagnosis section below.    New symptoms or complaints: None    Functional Impairment (0-4):   Personal: 2  Family: 1  Work: 3  Social:3    Clinical Assessment of Mental Status  Mood: Euthymic    Affect:   Congruent with content of speech    Appearance:  well-groomed, casually dressed, engaged    Speech:  Within normal limits    Orientation:   Oriented to person, time, and place    Memory:  No evidence of impairment.    Concentration:  Within normal limits    Focus:   Within normal limits    Fund of knowledge:  adequate    Suicidal Ideation present:   Absent  Suicidal Risk Assessment:  No specific plan to harm self.  Patient does not endorse any intention to harm self.  Patient is aware of resources available if she experiences suicidal ideation.      Homicidal Risk Assessment:   None reported  No crisis plan required due to absence of risk.    Patient's impression of their current status:  Symptoms are about the same.  She notes that the tinnitus has been causing a lot of anxiety lately.    Therapist impression of patient's current status: Symptoms are about the same.  She continues to be socially quite active, but has expectations for her energy level that  are not in concordance with her age and health.    Type of psychotherapeutic technique provided:  CBT, motivational interviewing, clinical hypnosis focused on body sensation, specifically regarding the sensation around her head and in her ears    Response to psychotherapeutic interventions:  Patient responded to interventions in the following manner: Enthusiastic.  She generated an image of when she was the age of 3 that reframed the tinnitus as with sling in her ears from the wind as she played by the trees.  This is very helpful to her and she plans to use this going forward.    Progress toward short term goals:  Progress as expected: Patient is practicing skills taught in psychotherapy and seeing benefits.    Review of long-term goals:  Not done at today's visit. Tx Plan updated 11/7/18:  1. Increase ability to cope well with my chronic health conditions from  some  of the time to most of the time.  2.              Increase % of time I communicate assertively with others from  40% to 80%.  3.  Improve ability to get good sleep from one night out of the week to four nights per week.  Diagnosis:   1. Major depressive disorder, recurrent episode, moderate degree (H)    2. Generalized anxiety disorder      Plan and Follow-Up:  Patient will return for follow-up psychotherapy session in one week    Patient will complete the following homework before our next session:  Patient plans to continue working with above-mentioned skills.    Discharge Criteria/ Planning:  Patient will continue with follow-up until therapy can be discontinued without return of symptoms.      Candelaria Barr 12/12/2018

## 2021-06-22 NOTE — PROGRESS NOTES
MENTAL HEALTH VISIT NOTE    Date of Service: 1/9/2019    Start time:2:04 pm  Stop time:2:55 pm  This is session number 1 this calendar year.  The patient was seen for individual psychotherapy    No  was required because the patient speaks and understands English.  Shyanne Gonzalez is a 83 y.o. female is being seen today for individual psychotherapy to address the following:    Chief Complaint   Patient presents with     Depression     Anxiety   .     NECESSITY: This individual session was necessary for the care of the patient to address difficulties in psychosocial functioning that are affected by and affect the patient's ability to cope with and heal from spinal conditions and are affected by the patient's mental health diagnosis listed in the diagnosis section below.    New symptoms or complaints: None    Functional Impairment (0-4):   Personal: 4  Family: 1  Work: 2  Social:2    Clinical Assessment of Mental Status  Mood: Sad and Anxious    Affect:   Congruent with content of speech    Appearance:  well-groomed, casually dressed, engaged    Speech:  Within normal limits    Orientation:   Oriented to person, time, and place    Memory:  No evidence of impairment.    Concentration:  Within normal limits    Focus:   Within normal limits    Fund of knowledge:  adequate    Suicidal Ideation present:   Absent  Suicidal Risk Assessment:  No specific plan to harm self.  Patient does not endorse any intention to harm self.  Patient is aware of resources available if she experiences suicidal ideation.      Homicidal Risk Assessment:   None reported  No crisis plan required due to absence of risk.    Patient's impression of their current status:  Symptoms are staying about the same.  She notes continued concern about her health.    Therapist impression of patient's current status: Symptoms are staying about the same.  Insight into lifelong patterns is improving.    Type of psychotherapeutic technique  "provided:  CBT, motivational interviewing    Response to psychotherapeutic interventions:  Patient responded to interventions in the following manner: Accepting.    Progress toward short term goals:  Progress as expected: Patient is practicing skills taught in psychotherapy and seeing benefits.    Review of long-term goals:  Not done at today's visit. Tx Plan updated 11/7/18:  1. Increase ability to cope well with my chronic health conditions from  some  of the time to most of the time.  2.              Increase % of time I communicate assertively with others from  40% to 80%.  3.  Improve ability to get good sleep from one night out of the week to four nights per week.    Diagnosis:   1. Major depressive disorder, recurrent episode, moderate degree (H)    2. Generalized anxiety disorder      Plan and Follow-Up:  Patient will return for follow-up psychotherapy session in 2 weeks.    Patient will complete the following homework before our next session:  Patient plans to continue working with above-mentioned skills.  Continue to consider the idea of being \"not enough.\"  Discharge Criteria/ Planning:  Patient will continue with follow-up until therapy can be discontinued without return of symptoms.      Candelaria Barr 1/9/2019    "

## 2021-06-24 NOTE — PROGRESS NOTES
MENTAL HEALTH VISIT NOTE    Date of Service: 2/22/2019    Start time:2:02 pm  Stop time:2:52 pm  This is session number 2 this calendar year.  The patient was seen for individual psychotherapy    No  was required because the patient speaks and understands English.  Shyanne Gonzalez is a 83 y.o. female is being seen today for individual psychotherapy to address the following:    Chief Complaint   Patient presents with     Depression     Anxiety   .     NECESSITY: This individual session was necessary for the care of the patient to address difficulties in psychosocial functioning that are affected by and affect the patient's ability to cope with and heal from spinal conditions and are affected by the patient's mental health diagnosis listed in the diagnosis section below.    New symptoms or complaints: None    Functional Impairment (0-4):   Personal: 3  Family: 2  Work: 2  Social:2    Clinical Assessment of Mental Status  Mood: Euthymic    Affect:   Congruent with content of speech    Appearance:  well-groomed, casually dressed, engaged    Speech:  Within normal limits    Orientation:   Oriented to person, time, and place    Memory:  No evidence of impairment.    Concentration:  Within normal limits    Focus:   Within normal limits    Fund of knowledge:  adequate    Suicidal Ideation present:   Absent  Suicidal Risk Assessment:  No specific plan to harm self.  Patient does not endorse any intention to harm self.  Patient is aware of resources available if she experiences suicidal ideation.      Homicidal Risk Assessment:   None reported  No crisis plan required due to absence of risk.    Patient's impression of their current status:  Overall symptoms are improving as she has made the decision to move to [place] and be with other sisters.    Therapist impression of patient's current status: Symptoms are improving.  She is less anxious as she has a plan for her future.    Type of psychotherapeutic technique  provided:  CBT, motivational interviewing    Response to psychotherapeutic interventions:  Patient responded to interventions in the following manner: Accepting.  She would like to be kinder to herself.    Progress toward short term goals:  Progress as expected: Patient is practicing skills taught in psychotherapy and seeing benefits.    Review of long-term goals:  Not done at today's visit. Tx Plan updated 11/7/18:  1. Increase ability to cope well with my chronic health conditions from  some  of the time to most of the time.  2.              Increase % of time I communicate assertively with others from  40% to 80%.  3.  Improve ability to get good sleep from one night out of the week to four nights per week.    Diagnosis:   1. Major depressive disorder, recurrent episode, moderate degree (H)    2. Generalized anxiety disorder      Plan and Follow-Up:  Patient will return for follow-up psychotherapy session in 3 weeks.    Patient will complete the following homework before our next session:  Patient plans to continue working with above-mentioned skills.    Discharge Criteria/ Planning:  Patient will continue with follow-up until therapy can be discontinued without return of symptoms.      Candelaria Barr 2/22/2019

## 2021-06-25 NOTE — PROGRESS NOTES
MENTAL HEALTH VISIT NOTE    Date of Service: 3/19/2019    Start time:2:00 pm  Stop time:2:50 pm  This is session number 3 this calendar year.  The patient was seen for individual psychotherapy    No  was required because the patient speaks and understands English.  Shyanne Gonzalez is a 83 y.o. female is being seen today for individual psychotherapy to address the following:    Chief Complaint   Patient presents with     Depression     Anxiety   .     NECESSITY: This individual session was necessary for the care of the patient to address difficulties in psychosocial functioning that are affected by and affect the patient's ability to cope with and heal from spinal conditions and are affected by the patient's mental health diagnosis listed in the diagnosis section below.    New symptoms or complaints: None    Functional Impairment (0-4):   Personal: 3  Family: 1  Work: 1  Social:2    Clinical Assessment of Mental Status  Mood: Sad    Affect:   Congruent with content of speech    Appearance:  well-groomed, casually dressed, engaged    Speech:  Within normal limits    Orientation:   Oriented to person, time, and place    Memory:  No evidence of impairment.    Concentration:  Within normal limits    Focus:   Distractible    Fund of knowledge:  adequate    Suicidal Ideation present:   Absent  Suicidal Risk Assessment:  No specific plan to harm self.  Patient does not endorse any intention to harm self.  Patient is aware of resources available if she experiences suicidal ideation.      Homicidal Risk Assessment:   None reported  No crisis plan required due to absence of risk.    Patient's impression of their current status:  She has been experiencing more depressive symptoms lately.    Therapist impression of patient's current status: She is improving her ability to be assertive, but mood has been poor lately.    Type of psychotherapeutic technique provided:  CBT, motivational interviewing    Response to  psychotherapeutic interventions:  Patient responded to interventions in the following manner: Accepting.    Progress toward short term goals:  Progress as expected: Patient is practicing skills taught in psychotherapy and seeing benefits.    Review of long-term goals:  Not done at today's visit. Tx Plan updated 11/7/18:  1. Increase ability to cope well with my chronic health conditions from  some  of the time to most of the time.  2.              Increase % of time I communicate assertively with others from  40% to 80%.  3.  Improve ability to get good sleep from one night out of the week to four nights per week.    Diagnosis:   1. Major depressive disorder, recurrent episode, moderate degree (H)    2. Generalized anxiety disorder      Plan and Follow-Up:  Patient will return for follow-up psychotherapy session in 2-3 weeks    Patient will complete the following homework before our next session:  Patient plans to continue working with above-mentioned skills.    Discharge Criteria/ Planning:  Patient will continue with follow-up until therapy can be discontinued without return of symptoms.      Candelaria Barr 3/19/2019

## 2021-06-28 NOTE — PROGRESS NOTES
Progress Notes by Patrick Bravo PA-C at 10/17/2019  2:30 PM     Author: Patrick Bravo PA-C Service: -- Author Type: Physician Assistant    Filed: 10/17/2019  3:38 PM Encounter Date: 10/17/2019 Status: Signed    : Patrick Bravo PA-C (Physician Assistant)       Chief Complaint:    Chief Complaint   Patient presents with   ? bilateral lower extremity edema worsening x 10 days     balance is also off   ? feels like getting shingles on back       HPI: Shyanne Gonzalez is an 84 y.o. female who presents for evaluation and treatment of bilateral leg edema.  Symptoms have been worsening over the past 10 days.  She denies any pain in the legs.  She has had this happen in the past.  She has not been elevating the legs.  No chest pain, shortness of breath, or fatigue.  No Hx of CHF.      She also mentions a rash on the back that started 4 days ago on the R side. The rash has a prickly burning quality.  The pain has been improving.  She denies any new soaps, lotions, detergents, foods, or medications.    ROS:    Review of Systems   Constitutional: Negative.  Negative for activity change, appetite change, fatigue, fever and unexpected weight change.   HENT: Negative.  Negative for congestion, ear discharge, ear pain, sinus pressure, sinus pain, sore throat and trouble swallowing.    Eyes: Negative for pain, discharge, redness and itching.   Respiratory: Negative.  Negative for chest tightness, shortness of breath and wheezing.    Cardiovascular: Positive for leg swelling. Negative for chest pain and palpitations.   Gastrointestinal: Negative.  Negative for abdominal pain, blood in stool, diarrhea, nausea and vomiting.   Endocrine: Negative.  Negative for polydipsia.   Genitourinary: Negative.  Negative for dysuria, frequency and urgency.   Musculoskeletal: Negative.  Negative for arthralgias and myalgias.   Skin: Positive for rash. Negative for color change and wound.   Allergic/Immunologic: Negative.  Negative  for immunocompromised state.   Neurological: Negative.  Negative for dizziness and headaches.   Hematological: Negative.  Negative for adenopathy.        Family History  Family History   Problem Relation Age of Onset   ? Cancer Brother    ? Pulmonary fibrosis Brother    ? Pulmonary fibrosis Nephew    ? BRCA 1/2 Neg Hx    ? Breast cancer Neg Hx    ? Colon cancer Neg Hx    ? Endometrial cancer Neg Hx    ? Ovarian cancer Neg Hx        Social History  Social History     Socioeconomic History   ? Marital status: Single     Spouse name: Not on file   ? Number of children: Not on file   ? Years of education: Not on file   ? Highest education level: Not on file   Occupational History   ? Not on file   Social Needs   ? Financial resource strain: Not on file   ? Food insecurity:     Worry: Not on file     Inability: Not on file   ? Transportation needs:     Medical: Not on file     Non-medical: Not on file   Tobacco Use   ? Smoking status: Never Smoker   ? Smokeless tobacco: Never Used   Substance and Sexual Activity   ? Alcohol use: No   ? Drug use: No   ? Sexual activity: Never   Lifestyle   ? Physical activity:     Days per week: Not on file     Minutes per session: Not on file   ? Stress: Not on file   Relationships   ? Social connections:     Talks on phone: Not on file     Gets together: Not on file     Attends [employer] service: Not on file     Active member of club or organization: Not on file     Attends meetings of clubs or organizations: Not on file     Relationship status: Not on file   ? Intimate partner violence:     Fear of current or ex partner: Not on file     Emotionally abused: Not on file     Physically abused: Not on file     Forced sexual activity: Not on file   Other Topics Concern   ? Not on file   Social History Narrative    2/1/[address] sister of [employer]        Surgical History:  Past Surgical History:   Procedure Laterality Date   ? BACK SURGERY  2013    lumbar discectomy and laminectomy    ?  CYSTOCELE REPAIR     ? HYSTERECTOMY  1996   ? PELVIC FLOOR REPAIR  2004        Problem List:  Patient Active Problem List   Diagnosis   ? Back Pain   ? Anxiety state   ? Depression with anxiety   ? Slow transit constipation   ? Obstructive sleep apnea hypopnea, mild        Allergies:  Allergies   Allergen Reactions   ? Gabapentin Nausea Only and Other (See Comments)     insomnia   ? Gluten    ? Latex Itching     Added based on information entered during case entry, please review and add reactions, type, and severity as needed   ? Neuromuscular Blockers, Steroidal Nausea Only     Weight loss and insomina        Current Meds:    Current Outpatient Medications:   ?  acetaminophen (TYLENOL ARTHRITIS PAIN ORAL), Take by mouth. Takes 1 twice daily, Disp: , Rfl:   ?  amitriptyline (ELAVIL) 10 MG tablet, Take 20 mg by mouth at bedtime., Disp: , Rfl:   ?  clonazePAM (KLONOPIN) 0.5 MG tablet, Take 0.5 mg by mouth 2 (two) times a day.    , Disp: , Rfl:   ?  magnesium ascorbate, bulk, Powd, Use 400 mg As Directed 3 (three) times a week., Disp: , Rfl:   ?  naproxen sodium (ALEVE) 220 MG tablet, Take 220 mg by mouth every 12 (twelve) hours as needed for pain., Disp: , Rfl:   ?  oxyCODONE (OXYCONTIN) 10 mg 12 hr tablet, Take 10 mg by mouth. Every other day for extreme pain, Disp: , Rfl:   ?  predniSONE (DELTASONE) 10 mg tablet, Take 5 mg by mouth daily ., Disp: , Rfl:   ?  acetaminophen (TYLENOL) 650 MG CR tablet, Take 650 mg by mouth every 8 (eight) hours as needed for pain., Disp: , Rfl:   ?  ascorbic acid, vitamin C, (ASCORBIC ACID WITH MIKEY HIPS) 500 MG tablet, Take 500 mg by mouth daily., Disp: , Rfl:   ?  cholecalciferol, vitamin D3, 5,000 unit Tab, Take 5,000 Units by mouth 3 (three) times a week. Mon, Wed, Fri, Disp: , Rfl:   ?  cyanocobalamin 1,000 mcg/mL injection, Inject 1,000 mcg into the shoulder, thigh, or buttocks every 30 (thirty) days.    , Disp: , Rfl:   ?  valACYclovir (VALTREX) 1000 MG tablet, Take 1 tablet  (1,000 mg total) by mouth 2 (two) times a day for 7 days., Disp: 14 tablet, Rfl: 0     PHYSICAL EXAM:   Vital signs noted and reviewed by Patrick Bravo    /52 (Patient Site: Right Arm, Patient Position: Sitting, Cuff Size: Adult Regular)   Pulse 77   Temp 98.2  F (36.8  C) (Oral)   Resp 20   Wt 158 lb (71.7 kg)   SpO2 95%   BMI 27.12 kg/m       PEFR:  Physical Exam  Vitals signs reviewed.   Constitutional:       Appearance: Normal appearance. She is well-developed. She is not ill-appearing or toxic-appearing.   HENT:      Head: Normocephalic and atraumatic.      Right Ear: Hearing, tympanic membrane, ear canal and external ear normal. No drainage, swelling or tenderness. Tympanic membrane is not perforated, erythematous, retracted or bulging.      Left Ear: Hearing, tympanic membrane, ear canal and external ear normal. No drainage, swelling or tenderness. Tympanic membrane is not perforated, erythematous, retracted or bulging.      Nose: No nasal deformity, mucosal edema, congestion or rhinorrhea.      Right Sinus: No maxillary sinus tenderness or frontal sinus tenderness.      Left Sinus: No maxillary sinus tenderness or frontal sinus tenderness.      Mouth/Throat:      Pharynx: No pharyngeal swelling, oropharyngeal exudate, posterior oropharyngeal erythema or uvula swelling.      Tonsils: No tonsillar exudate or tonsillar abscesses. Swellin on the right. 0 on the left.   Eyes:      General: Lids are normal.         Right eye: No foreign body or discharge.         Left eye: No foreign body or discharge.      Conjunctiva/sclera:      Right eye: Right conjunctiva is not injected.      Left eye: Left conjunctiva is not injected.   Neck:      Musculoskeletal: Full passive range of motion without pain and normal range of motion. No neck rigidity.   Cardiovascular:      Rate and Rhythm: Normal rate and regular rhythm.      Pulses: Normal pulses.      Heart sounds: Normal heart sounds, S1 normal and S2  normal. No murmur. No friction rub. No gallop.    Pulmonary:      Effort: Pulmonary effort is normal. No accessory muscle usage, prolonged expiration, respiratory distress or retractions.      Breath sounds: Normal breath sounds and air entry. No stridor, decreased air movement or transmitted upper airway sounds. No decreased breath sounds, wheezing or rales.          Comments: Papules on erythematous base consistent with shingles.  No vesicles or weeping at this time.  Abdominal:      General: Bowel sounds are normal.      Palpations: Abdomen is soft.      Tenderness: There is no tenderness. There is no right CVA tenderness or left CVA tenderness.   Musculoskeletal:      Right lower le+ Edema present.      Left lower le+ Edema present.   Lymphadenopathy:      Head:      Right side of head: No submental, submandibular, tonsillar, preauricular or posterior auricular adenopathy.      Left side of head: No submental, submandibular, tonsillar, preauricular or posterior auricular adenopathy.      Cervical:      Right cervical: No superficial or posterior cervical adenopathy.     Left cervical: No superficial or posterior cervical adenopathy.   Skin:     General: Skin is warm.      Coloration: Skin is not cyanotic or jaundiced.   Neurological:      Mental Status: She is alert.      Motor: No weakness or abnormal muscle tone.      Coordination: Coordination is intact.      Gait: Gait is intact. Gait normal.      Deep Tendon Reflexes: Reflexes are normal and symmetric.   Psychiatric:         Attention and Perception: Attention normal.         Mood and Affect: Mood normal.         Speech: Speech normal.         Behavior: Behavior normal.         Thought Content: Thought content normal.          Labs:     Medical Decision Making:    Differential Diagnosis:    Shingles, CHF, venous stasis,      ASSESSMENT:     1. Herpes zoster without complication    2. Lower leg edema         PLAN:     Rx for Valtrex for  shingles.  Bilateral feet and lower legs wrapped with ACE wrap. She is to elevate the legs several times per day.    Patient instructed to follow up with PCP in the next 2 weeks after she moves for further evaluation.  Worrisome symptoms discussed with instructions to go to the ED.  Patient verbalized understanding and agreed with this plan.     Patrick Bravo  10/17/2019, 2:54 PM

## 2021-07-03 NOTE — ADDENDUM NOTE
Addendum Note by Eli Peterson RN at 5/7/2018  3:50 PM     Author: Eli Peterson RN Service: -- Author Type: Registered Nurse    Filed: 5/7/2018  3:50 PM Encounter Date: 5/7/2018 Status: Signed    : Eli Peterson RN (Registered Nurse)    Addended by: ELI VILLARREAL on: 5/7/2018 03:50 PM        Modules accepted: Orders

## 2021-07-13 ENCOUNTER — RECORDS - HEALTHEAST (OUTPATIENT)
Dept: ADMINISTRATIVE | Facility: CLINIC | Age: 86
End: 2021-07-13

## 2021-07-21 ENCOUNTER — RECORDS - HEALTHEAST (OUTPATIENT)
Dept: ADMINISTRATIVE | Facility: CLINIC | Age: 86
End: 2021-07-21

## 2021-10-10 ENCOUNTER — HEALTH MAINTENANCE LETTER (OUTPATIENT)
Age: 86
End: 2021-10-10

## 2022-07-16 ENCOUNTER — HEALTH MAINTENANCE LETTER (OUTPATIENT)
Age: 87
End: 2022-07-16

## 2022-09-18 ENCOUNTER — HEALTH MAINTENANCE LETTER (OUTPATIENT)
Age: 87
End: 2022-09-18

## 2023-07-30 ENCOUNTER — HEALTH MAINTENANCE LETTER (OUTPATIENT)
Age: 88
End: 2023-07-30